# Patient Record
Sex: MALE | Race: WHITE | Employment: FULL TIME | URBAN - METROPOLITAN AREA
[De-identification: names, ages, dates, MRNs, and addresses within clinical notes are randomized per-mention and may not be internally consistent; named-entity substitution may affect disease eponyms.]

---

## 2017-01-17 ENCOUNTER — NURSE ONLY (OUTPATIENT)
Dept: SURGERY | Age: 42
End: 2017-01-17

## 2017-01-17 DIAGNOSIS — E29.1 HYPOGONADISM MALE: Primary | ICD-10-CM

## 2017-01-17 PROCEDURE — 96372 THER/PROPH/DIAG INJ SC/IM: CPT | Performed by: INTERNAL MEDICINE

## 2017-01-17 RX ORDER — TESTOSTERONE CYPIONATE 200 MG/ML
300 INJECTION INTRAMUSCULAR ONCE
Status: COMPLETED | OUTPATIENT
Start: 2017-01-17 | End: 2017-01-17

## 2017-01-17 RX ADMIN — TESTOSTERONE CYPIONATE 300 MG: 200 INJECTION INTRAMUSCULAR at 15:55

## 2017-01-31 ENCOUNTER — OFFICE VISIT (OUTPATIENT)
Dept: FAMILY MEDICINE CLINIC | Age: 42
End: 2017-01-31

## 2017-01-31 VITALS
SYSTOLIC BLOOD PRESSURE: 138 MMHG | TEMPERATURE: 97.9 F | WEIGHT: 238 LBS | HEIGHT: 70 IN | BODY MASS INDEX: 34.07 KG/M2 | HEART RATE: 78 BPM | RESPIRATION RATE: 14 BRPM | DIASTOLIC BLOOD PRESSURE: 86 MMHG

## 2017-01-31 DIAGNOSIS — J01.10 ACUTE NON-RECURRENT FRONTAL SINUSITIS: ICD-10-CM

## 2017-01-31 DIAGNOSIS — J06.9 UPPER RESPIRATORY TRACT INFECTION, UNSPECIFIED TYPE: Primary | ICD-10-CM

## 2017-01-31 PROCEDURE — 99213 OFFICE O/P EST LOW 20 MIN: CPT | Performed by: FAMILY MEDICINE

## 2017-01-31 RX ORDER — SULFAMETHOXAZOLE AND TRIMETHOPRIM 800; 160 MG/1; MG/1
1 TABLET ORAL 2 TIMES DAILY
Qty: 20 TABLET | Refills: 0 | Status: SHIPPED | OUTPATIENT
Start: 2017-01-31 | End: 2017-02-10

## 2017-01-31 RX ORDER — METHYLPREDNISOLONE 4 MG/1
TABLET ORAL
Qty: 1 KIT | Refills: 0 | Status: SHIPPED | OUTPATIENT
Start: 2017-01-31 | End: 2017-05-11 | Stop reason: ALTCHOICE

## 2017-01-31 ASSESSMENT — PATIENT HEALTH QUESTIONNAIRE - PHQ9
2. FEELING DOWN, DEPRESSED OR HOPELESS: 0
SUM OF ALL RESPONSES TO PHQ QUESTIONS 1-9: 0
SUM OF ALL RESPONSES TO PHQ9 QUESTIONS 1 & 2: 0
1. LITTLE INTEREST OR PLEASURE IN DOING THINGS: 0

## 2017-01-31 ASSESSMENT — ENCOUNTER SYMPTOMS
SORE THROAT: 1
NAUSEA: 1
DIARRHEA: 0
SINUS PAIN: 1
SWOLLEN GLANDS: 0
ABDOMINAL PAIN: 0
COUGH: 1
VOMITING: 0
WHEEZING: 0
RHINORRHEA: 1

## 2017-02-21 ENCOUNTER — NURSE ONLY (OUTPATIENT)
Dept: SURGERY | Age: 42
End: 2017-02-21

## 2017-02-21 DIAGNOSIS — E29.1 HYPOGONADISM MALE: Primary | ICD-10-CM

## 2017-02-21 PROCEDURE — 96372 THER/PROPH/DIAG INJ SC/IM: CPT | Performed by: INTERNAL MEDICINE

## 2017-02-21 RX ORDER — TESTOSTERONE CYPIONATE 200 MG/ML
300 INJECTION INTRAMUSCULAR
COMMUNITY

## 2017-02-21 RX ORDER — TESTOSTERONE CYPIONATE 200 MG/ML
300 INJECTION INTRAMUSCULAR ONCE
Status: COMPLETED | OUTPATIENT
Start: 2017-02-21 | End: 2017-02-21

## 2017-02-21 RX ADMIN — TESTOSTERONE CYPIONATE 300 MG: 200 INJECTION INTRAMUSCULAR at 15:55

## 2017-03-22 ENCOUNTER — NURSE ONLY (OUTPATIENT)
Dept: FAMILY MEDICINE CLINIC | Age: 42
End: 2017-03-22

## 2017-03-22 DIAGNOSIS — E29.1 HYPOGONADISM MALE: Primary | ICD-10-CM

## 2017-03-22 PROCEDURE — 96372 THER/PROPH/DIAG INJ SC/IM: CPT | Performed by: FAMILY MEDICINE

## 2017-03-22 RX ORDER — TESTOSTERONE CYPIONATE 200 MG/ML
300 INJECTION INTRAMUSCULAR ONCE
Status: COMPLETED | OUTPATIENT
Start: 2017-03-22 | End: 2017-03-22

## 2017-03-22 RX ADMIN — TESTOSTERONE CYPIONATE 300 MG: 200 INJECTION INTRAMUSCULAR at 08:07

## 2017-05-02 ENCOUNTER — NURSE ONLY (OUTPATIENT)
Dept: SURGERY | Age: 42
End: 2017-05-02

## 2017-05-02 DIAGNOSIS — E29.1 HYPOGONADISM MALE: Primary | ICD-10-CM

## 2017-05-02 PROCEDURE — 96372 THER/PROPH/DIAG INJ SC/IM: CPT | Performed by: INTERNAL MEDICINE

## 2017-05-02 RX ORDER — TESTOSTERONE CYPIONATE 200 MG/ML
300 INJECTION INTRAMUSCULAR ONCE
Status: COMPLETED | OUTPATIENT
Start: 2017-05-02 | End: 2017-05-02

## 2017-05-02 RX ADMIN — TESTOSTERONE CYPIONATE 300 MG: 200 INJECTION INTRAMUSCULAR at 15:30

## 2017-05-09 ENCOUNTER — TELEPHONE (OUTPATIENT)
Dept: FAMILY MEDICINE CLINIC | Age: 42
End: 2017-05-09

## 2017-05-11 ENCOUNTER — OFFICE VISIT (OUTPATIENT)
Dept: FAMILY MEDICINE CLINIC | Age: 42
End: 2017-05-11

## 2017-05-11 VITALS
TEMPERATURE: 97.9 F | SYSTOLIC BLOOD PRESSURE: 118 MMHG | RESPIRATION RATE: 12 BRPM | HEIGHT: 70 IN | WEIGHT: 235 LBS | DIASTOLIC BLOOD PRESSURE: 82 MMHG | HEART RATE: 80 BPM | BODY MASS INDEX: 33.64 KG/M2

## 2017-05-11 DIAGNOSIS — D49.2 SKIN NEOPLASM: Primary | ICD-10-CM

## 2017-05-11 DIAGNOSIS — N20.0 KIDNEY STONE: ICD-10-CM

## 2017-05-11 DIAGNOSIS — Z09 HOSPITAL DISCHARGE FOLLOW-UP: ICD-10-CM

## 2017-05-11 PROCEDURE — 11404 EXC TR-EXT B9+MARG 3.1-4 CM: CPT | Performed by: FAMILY MEDICINE

## 2017-05-11 PROCEDURE — 11420 EXC H-F-NK-SP B9+MARG 0.5/<: CPT | Performed by: FAMILY MEDICINE

## 2017-05-11 ASSESSMENT — ENCOUNTER SYMPTOMS
SINUS PRESSURE: 0
SORE THROAT: 0
EYE DISCHARGE: 0
COUGH: 0
EYE ITCHING: 0
CONSTIPATION: 0
DIARRHEA: 0
SHORTNESS OF BREATH: 0
ABDOMINAL PAIN: 0

## 2017-05-12 ENCOUNTER — OFFICE VISIT (OUTPATIENT)
Dept: FAMILY MEDICINE CLINIC | Age: 42
End: 2017-05-12

## 2017-05-12 VITALS
WEIGHT: 235 LBS | HEIGHT: 70 IN | RESPIRATION RATE: 16 BRPM | SYSTOLIC BLOOD PRESSURE: 118 MMHG | HEART RATE: 76 BPM | BODY MASS INDEX: 33.64 KG/M2 | DIASTOLIC BLOOD PRESSURE: 82 MMHG | TEMPERATURE: 97.8 F

## 2017-05-12 DIAGNOSIS — K80.20 GALLSTONES: ICD-10-CM

## 2017-05-12 DIAGNOSIS — R10.11 RUQ PAIN: ICD-10-CM

## 2017-05-12 DIAGNOSIS — Z09 HOSPITAL DISCHARGE FOLLOW-UP: ICD-10-CM

## 2017-05-12 DIAGNOSIS — N20.0 KIDNEY STONES: ICD-10-CM

## 2017-05-12 DIAGNOSIS — N23 RENAL COLIC ON LEFT SIDE: Primary | ICD-10-CM

## 2017-05-12 PROCEDURE — 99213 OFFICE O/P EST LOW 20 MIN: CPT | Performed by: FAMILY MEDICINE

## 2017-05-12 RX ORDER — HYDROCODONE BITARTRATE AND ACETAMINOPHEN 7.5; 325 MG/1; MG/1
1 TABLET ORAL EVERY 6 HOURS PRN
Qty: 30 TABLET | Refills: 0 | Status: SHIPPED | OUTPATIENT
Start: 2017-05-12 | End: 2017-09-07 | Stop reason: ALTCHOICE

## 2017-05-12 ASSESSMENT — ENCOUNTER SYMPTOMS
SORE THROAT: 0
ABDOMINAL PAIN: 1
EYE ITCHING: 0
SINUS PRESSURE: 0
DIARRHEA: 0
EYE DISCHARGE: 0
CONSTIPATION: 0
COUGH: 0
SHORTNESS OF BREATH: 0

## 2017-05-18 ENCOUNTER — OFFICE VISIT (OUTPATIENT)
Dept: FAMILY MEDICINE CLINIC | Age: 42
End: 2017-05-18

## 2017-05-18 ENCOUNTER — HOSPITAL ENCOUNTER (OUTPATIENT)
Dept: ULTRASOUND IMAGING | Age: 42
Discharge: HOME | End: 2017-05-18
Payer: COMMERCIAL

## 2017-05-18 ENCOUNTER — TELEPHONE (OUTPATIENT)
Dept: FAMILY MEDICINE CLINIC | Age: 42
End: 2017-05-18

## 2017-05-18 VITALS
DIASTOLIC BLOOD PRESSURE: 68 MMHG | HEIGHT: 70 IN | HEART RATE: 68 BPM | BODY MASS INDEX: 33.36 KG/M2 | WEIGHT: 233 LBS | TEMPERATURE: 97.2 F | SYSTOLIC BLOOD PRESSURE: 118 MMHG | RESPIRATION RATE: 16 BRPM

## 2017-05-18 DIAGNOSIS — K80.20 GALLSTONES: ICD-10-CM

## 2017-05-18 DIAGNOSIS — Z48.02 VISIT FOR SUTURE REMOVAL: Primary | ICD-10-CM

## 2017-05-18 DIAGNOSIS — R10.11 RUQ PAIN: ICD-10-CM

## 2017-05-18 DIAGNOSIS — R93.5 ABNORMAL ULTRASOUND OF ABDOMEN: ICD-10-CM

## 2017-05-18 DIAGNOSIS — R16.0 LIVER MASS: Primary | ICD-10-CM

## 2017-05-18 PROCEDURE — 76705 ECHO EXAM OF ABDOMEN: CPT

## 2017-05-18 PROCEDURE — 99024 POSTOP FOLLOW-UP VISIT: CPT | Performed by: FAMILY MEDICINE

## 2017-05-18 ASSESSMENT — ENCOUNTER SYMPTOMS
DIARRHEA: 0
ABDOMINAL PAIN: 0
EYE DISCHARGE: 0
COUGH: 0
CONSTIPATION: 0
SINUS PRESSURE: 0
SORE THROAT: 0
EYE ITCHING: 0
SHORTNESS OF BREATH: 0

## 2017-05-24 ENCOUNTER — OFFICE VISIT (OUTPATIENT)
Dept: FAMILY MEDICINE CLINIC | Age: 42
End: 2017-05-24

## 2017-05-24 VITALS
SYSTOLIC BLOOD PRESSURE: 108 MMHG | TEMPERATURE: 97.6 F | WEIGHT: 236 LBS | HEIGHT: 70 IN | HEART RATE: 81 BPM | BODY MASS INDEX: 33.79 KG/M2 | RESPIRATION RATE: 20 BRPM | DIASTOLIC BLOOD PRESSURE: 72 MMHG

## 2017-05-24 DIAGNOSIS — I20.9 ANGINA, CLASS II (HCC): ICD-10-CM

## 2017-05-24 DIAGNOSIS — R05.8 NON-PRODUCTIVE COUGH: ICD-10-CM

## 2017-05-24 DIAGNOSIS — N20.0 KIDNEY STONES: ICD-10-CM

## 2017-05-24 DIAGNOSIS — J30.2 SEASONAL ALLERGIC RHINITIS, UNSPECIFIED ALLERGIC RHINITIS TRIGGER: ICD-10-CM

## 2017-05-24 DIAGNOSIS — R10.32 GROIN PAIN, LEFT: Primary | ICD-10-CM

## 2017-05-24 LAB
BILIRUBIN, POC: NORMAL
BLOOD URINE, POC: NORMAL
CLARITY, POC: CLEAR
COLOR, POC: NORMAL
GLUCOSE URINE, POC: NORMAL
KETONES, POC: NORMAL
LEUKOCYTE EST, POC: NORMAL
NITRITE, POC: NORMAL
PH, POC: 7
PROTEIN, POC: NORMAL
SPECIFIC GRAVITY, POC: 1.01
UROBILINOGEN, POC: 0.2

## 2017-05-24 PROCEDURE — 81003 URINALYSIS AUTO W/O SCOPE: CPT | Performed by: FAMILY MEDICINE

## 2017-05-24 PROCEDURE — 99213 OFFICE O/P EST LOW 20 MIN: CPT | Performed by: FAMILY MEDICINE

## 2017-05-24 PROCEDURE — 96372 THER/PROPH/DIAG INJ SC/IM: CPT | Performed by: FAMILY MEDICINE

## 2017-05-24 RX ORDER — TRIAMCINOLONE ACETONIDE 40 MG/ML
80 INJECTION, SUSPENSION INTRA-ARTICULAR; INTRAMUSCULAR ONCE
Status: COMPLETED | OUTPATIENT
Start: 2017-05-24 | End: 2017-05-24

## 2017-05-24 RX ORDER — TAMSULOSIN HYDROCHLORIDE 0.4 MG/1
0.4 CAPSULE ORAL DAILY
Qty: 30 CAPSULE | Refills: 2 | Status: SHIPPED | OUTPATIENT
Start: 2017-05-24 | End: 2017-10-03 | Stop reason: SDUPTHER

## 2017-05-24 RX ORDER — HYDROCODONE BITARTRATE AND ACETAMINOPHEN 7.5; 325 MG/1; MG/1
1 TABLET ORAL EVERY 6 HOURS PRN
Qty: 30 TABLET | Refills: 0 | Status: SHIPPED | OUTPATIENT
Start: 2017-05-24 | End: 2017-08-25

## 2017-05-24 RX ADMIN — TRIAMCINOLONE ACETONIDE 80 MG: 40 INJECTION, SUSPENSION INTRA-ARTICULAR; INTRAMUSCULAR at 11:59

## 2017-05-24 ASSESSMENT — ENCOUNTER SYMPTOMS
NAUSEA: 1
CONSTIPATION: 0
EYE DISCHARGE: 1
EYE ITCHING: 1
DIARRHEA: 0
COUGH: 1
SORE THROAT: 1

## 2017-05-30 ENCOUNTER — HOSPITAL ENCOUNTER (OUTPATIENT)
Dept: CT IMAGING | Age: 42
Discharge: HOME | End: 2017-05-30
Payer: COMMERCIAL

## 2017-05-30 VITALS — HEIGHT: 70 IN | WEIGHT: 230 LBS | BODY MASS INDEX: 32.93 KG/M2

## 2017-05-30 DIAGNOSIS — R16.0 LIVER MASS: ICD-10-CM

## 2017-05-30 DIAGNOSIS — R93.5 ABNORMAL ULTRASOUND OF ABDOMEN: ICD-10-CM

## 2017-05-30 PROCEDURE — 6360000004 HC RX CONTRAST MEDICATION: Performed by: RADIOLOGY

## 2017-05-30 PROCEDURE — 2500000003 HC RX 250 WO HCPCS: Performed by: RADIOLOGY

## 2017-05-30 PROCEDURE — 74178 CT ABD&PLV WO CNTR FLWD CNTR: CPT

## 2017-05-30 RX ADMIN — IOPAMIDOL 100 ML: 755 INJECTION, SOLUTION INTRAVENOUS at 16:40

## 2017-05-30 RX ADMIN — BARIUM SULFATE 450 ML: 21 SUSPENSION ORAL at 16:40

## 2017-06-01 ENCOUNTER — TELEPHONE (OUTPATIENT)
Dept: FAMILY MEDICINE CLINIC | Age: 42
End: 2017-06-01

## 2017-06-01 DIAGNOSIS — R93.2 ABNORMAL LIVER CT: Primary | ICD-10-CM

## 2017-06-02 ENCOUNTER — NURSE ONLY (OUTPATIENT)
Dept: FAMILY MEDICINE CLINIC | Age: 42
End: 2017-06-02

## 2017-06-02 DIAGNOSIS — E29.1 HYPOGONADISM MALE: Primary | ICD-10-CM

## 2017-06-02 PROCEDURE — 96372 THER/PROPH/DIAG INJ SC/IM: CPT | Performed by: FAMILY MEDICINE

## 2017-06-02 RX ORDER — TESTOSTERONE CYPIONATE 200 MG/ML
200 INJECTION INTRAMUSCULAR ONCE
Status: SHIPPED | OUTPATIENT
Start: 2017-06-02

## 2017-06-02 RX ORDER — TESTOSTERONE CYPIONATE 200 MG/ML
300 INJECTION INTRAMUSCULAR ONCE
Status: COMPLETED | OUTPATIENT
Start: 2017-06-02 | End: 2017-06-02

## 2017-06-02 RX ADMIN — TESTOSTERONE CYPIONATE 300 MG: 200 INJECTION INTRAMUSCULAR at 15:03

## 2017-06-14 ENCOUNTER — HOSPITAL ENCOUNTER (OUTPATIENT)
Dept: MRI IMAGING | Age: 42
Discharge: HOME | End: 2017-06-14
Payer: COMMERCIAL

## 2017-06-14 VITALS — WEIGHT: 230 LBS | BODY MASS INDEX: 33 KG/M2

## 2017-06-14 DIAGNOSIS — R93.2 ABNORMAL LIVER CT: ICD-10-CM

## 2017-06-14 PROCEDURE — 74183 MRI ABD W/O CNTR FLWD CNTR: CPT

## 2017-06-14 PROCEDURE — A9579 GAD-BASE MR CONTRAST NOS,1ML: HCPCS | Performed by: FAMILY MEDICINE

## 2017-06-14 PROCEDURE — 6360000004 HC RX CONTRAST MEDICATION: Performed by: FAMILY MEDICINE

## 2017-06-14 RX ADMIN — GADOVERSETAMIDE 20 ML: 0.5 INJECTION, SOLUTION INTRAVENOUS at 08:40

## 2017-07-07 ENCOUNTER — NURSE ONLY (OUTPATIENT)
Dept: FAMILY MEDICINE CLINIC | Age: 42
End: 2017-07-07

## 2017-07-07 DIAGNOSIS — E29.1 HYPOGONADISM MALE: Primary | ICD-10-CM

## 2017-07-07 PROCEDURE — 96372 THER/PROPH/DIAG INJ SC/IM: CPT | Performed by: FAMILY MEDICINE

## 2017-07-07 RX ORDER — TESTOSTERONE CYPIONATE 200 MG/ML
300 INJECTION INTRAMUSCULAR ONCE
Status: COMPLETED | OUTPATIENT
Start: 2017-07-07 | End: 2017-07-07

## 2017-07-07 RX ADMIN — TESTOSTERONE CYPIONATE 300 MG: 200 INJECTION INTRAMUSCULAR at 15:34

## 2017-08-04 ENCOUNTER — NURSE ONLY (OUTPATIENT)
Dept: FAMILY MEDICINE CLINIC | Age: 42
End: 2017-08-04

## 2017-08-04 DIAGNOSIS — E29.1 HYPOGONADISM MALE: Primary | ICD-10-CM

## 2017-08-04 PROCEDURE — 96372 THER/PROPH/DIAG INJ SC/IM: CPT | Performed by: FAMILY MEDICINE

## 2017-08-04 RX ORDER — TESTOSTERONE CYPIONATE 200 MG/ML
300 INJECTION INTRAMUSCULAR ONCE
Status: COMPLETED | OUTPATIENT
Start: 2017-08-04 | End: 2017-08-04

## 2017-08-04 RX ADMIN — TESTOSTERONE CYPIONATE 300 MG: 200 INJECTION INTRAMUSCULAR at 15:51

## 2017-08-25 ENCOUNTER — OFFICE VISIT (OUTPATIENT)
Dept: PRIMARY CARE CLINIC | Age: 42
End: 2017-08-25

## 2017-08-25 VITALS
WEIGHT: 232 LBS | HEART RATE: 68 BPM | SYSTOLIC BLOOD PRESSURE: 130 MMHG | TEMPERATURE: 97.9 F | RESPIRATION RATE: 14 BRPM | HEIGHT: 70 IN | DIASTOLIC BLOOD PRESSURE: 74 MMHG | BODY MASS INDEX: 33.21 KG/M2

## 2017-08-25 DIAGNOSIS — N39.0 URINARY TRACT INFECTION WITHOUT HEMATURIA, SITE UNSPECIFIED: ICD-10-CM

## 2017-08-25 DIAGNOSIS — K58.8 OTHER IRRITABLE BOWEL SYNDROME: ICD-10-CM

## 2017-08-25 DIAGNOSIS — N20.0 KIDNEY STONE: ICD-10-CM

## 2017-08-25 DIAGNOSIS — R10.9 ABDOMINAL PAIN, UNSPECIFIED LOCATION: ICD-10-CM

## 2017-08-25 DIAGNOSIS — N20.0 KIDNEY STONE: Primary | ICD-10-CM

## 2017-08-25 LAB
BILIRUBIN, POC: NORMAL
BLOOD URINE, POC: NORMAL
CLARITY, POC: CLEAR
COLOR, POC: YELLOW
GLUCOSE URINE, POC: NORMAL
KETONES, POC: NORMAL
LEUKOCYTE EST, POC: NORMAL
NITRITE, POC: NORMAL
PH, POC: 7
PROTEIN, POC: NORMAL
SPECIFIC GRAVITY, POC: 1.02
UROBILINOGEN, POC: NORMAL

## 2017-08-25 PROCEDURE — 96372 THER/PROPH/DIAG INJ SC/IM: CPT | Performed by: FAMILY MEDICINE

## 2017-08-25 PROCEDURE — 81002 URINALYSIS NONAUTO W/O SCOPE: CPT | Performed by: FAMILY MEDICINE

## 2017-08-25 PROCEDURE — 99213 OFFICE O/P EST LOW 20 MIN: CPT | Performed by: FAMILY MEDICINE

## 2017-08-25 RX ORDER — CIPROFLOXACIN 500 MG/1
TABLET, FILM COATED ORAL
Qty: 14 TABLET | Refills: 0 | Status: SHIPPED | OUTPATIENT
Start: 2017-08-25 | End: 2017-09-04

## 2017-08-25 RX ORDER — KETOROLAC TROMETHAMINE 10 MG/1
10 TABLET, FILM COATED ORAL EVERY 6 HOURS PRN
Qty: 20 TABLET | Refills: 1 | Status: SHIPPED | OUTPATIENT
Start: 2017-08-25 | End: 2017-09-07 | Stop reason: ALTCHOICE

## 2017-08-25 RX ORDER — KETOROLAC TROMETHAMINE 30 MG/ML
60 INJECTION, SOLUTION INTRAMUSCULAR; INTRAVENOUS ONCE
Status: COMPLETED | OUTPATIENT
Start: 2017-08-25 | End: 2017-08-25

## 2017-08-25 RX ADMIN — KETOROLAC TROMETHAMINE 60 MG: 30 INJECTION, SOLUTION INTRAMUSCULAR; INTRAVENOUS at 12:58

## 2017-08-25 ASSESSMENT — ENCOUNTER SYMPTOMS
CHEST TIGHTNESS: 0
ABDOMINAL PAIN: 0
SHORTNESS OF BREATH: 0

## 2017-08-27 LAB — URINE CULTURE, ROUTINE: NORMAL

## 2017-09-05 ASSESSMENT — ENCOUNTER SYMPTOMS
BELCHING: 0
CONSTIPATION: 0
FLATUS: 0

## 2017-09-07 ENCOUNTER — OFFICE VISIT (OUTPATIENT)
Dept: FAMILY MEDICINE CLINIC | Age: 42
End: 2017-09-07

## 2017-09-07 VITALS
SYSTOLIC BLOOD PRESSURE: 138 MMHG | BODY MASS INDEX: 32.21 KG/M2 | TEMPERATURE: 97.2 F | HEART RATE: 70 BPM | RESPIRATION RATE: 20 BRPM | DIASTOLIC BLOOD PRESSURE: 82 MMHG | WEIGHT: 225 LBS | HEIGHT: 70 IN

## 2017-09-07 DIAGNOSIS — R05.8 NON-PRODUCTIVE COUGH: Primary | ICD-10-CM

## 2017-09-07 DIAGNOSIS — J01.90 ACUTE BACTERIAL SINUSITIS: ICD-10-CM

## 2017-09-07 DIAGNOSIS — B96.89 ACUTE BACTERIAL SINUSITIS: ICD-10-CM

## 2017-09-07 PROCEDURE — 99213 OFFICE O/P EST LOW 20 MIN: CPT | Performed by: FAMILY MEDICINE

## 2017-09-07 RX ORDER — NAPROXEN 500 MG/1
500 TABLET ORAL 2 TIMES DAILY
Refills: 0 | COMMUNITY
Start: 2017-08-22

## 2017-09-07 RX ORDER — AZITHROMYCIN 250 MG/1
TABLET, FILM COATED ORAL
Qty: 1 PACKET | Refills: 0 | Status: SHIPPED | OUTPATIENT
Start: 2017-09-07 | End: 2017-09-17

## 2017-09-07 ASSESSMENT — ENCOUNTER SYMPTOMS
EYE ITCHING: 1
CONSTIPATION: 0
ABDOMINAL PAIN: 0
EYE DISCHARGE: 0
DIARRHEA: 0
COUGH: 1
SINUS PRESSURE: 1
SORE THROAT: 1
EYE PAIN: 1
SHORTNESS OF BREATH: 0

## 2017-09-12 ENCOUNTER — NURSE ONLY (OUTPATIENT)
Dept: SURGERY | Age: 42
End: 2017-09-12

## 2017-09-12 ENCOUNTER — TELEPHONE (OUTPATIENT)
Dept: FAMILY MEDICINE CLINIC | Age: 42
End: 2017-09-12

## 2017-09-12 DIAGNOSIS — E29.1 HYPOGONADISM MALE: Primary | ICD-10-CM

## 2017-09-12 PROCEDURE — 96372 THER/PROPH/DIAG INJ SC/IM: CPT | Performed by: INTERNAL MEDICINE

## 2017-09-12 RX ORDER — TESTOSTERONE CYPIONATE 200 MG/ML
300 INJECTION INTRAMUSCULAR ONCE
Status: COMPLETED | OUTPATIENT
Start: 2017-09-12 | End: 2017-09-12

## 2017-10-03 RX ORDER — TAMSULOSIN HYDROCHLORIDE 0.4 MG/1
CAPSULE ORAL
Qty: 30 CAPSULE | Refills: 2 | Status: SHIPPED | OUTPATIENT
Start: 2017-10-03 | End: 2018-10-09 | Stop reason: SDUPTHER

## 2017-10-10 ENCOUNTER — NURSE ONLY (OUTPATIENT)
Dept: SURGERY | Age: 42
End: 2017-10-10

## 2017-10-10 DIAGNOSIS — E29.1 HYPOGONADISM MALE: Primary | ICD-10-CM

## 2017-10-10 PROCEDURE — 96372 THER/PROPH/DIAG INJ SC/IM: CPT | Performed by: INTERNAL MEDICINE

## 2017-10-10 PROCEDURE — 99999 PR OFFICE/OUTPT VISIT,PROCEDURE ONLY: CPT | Performed by: INTERNAL MEDICINE

## 2017-10-10 RX ORDER — TESTOSTERONE CYPIONATE 200 MG/ML
400 INJECTION INTRAMUSCULAR ONCE
Status: COMPLETED | OUTPATIENT
Start: 2017-10-10 | End: 2017-10-10

## 2017-10-10 RX ADMIN — TESTOSTERONE CYPIONATE 400 MG: 200 INJECTION INTRAMUSCULAR at 15:31

## 2017-11-07 ENCOUNTER — NURSE ONLY (OUTPATIENT)
Dept: SURGERY | Age: 42
End: 2017-11-07

## 2017-11-07 DIAGNOSIS — E29.1 HYPOGONADISM MALE: Primary | ICD-10-CM

## 2017-11-07 PROCEDURE — 96372 THER/PROPH/DIAG INJ SC/IM: CPT | Performed by: INTERNAL MEDICINE

## 2017-11-07 RX ORDER — TESTOSTERONE CYPIONATE 200 MG/ML
400 INJECTION INTRAMUSCULAR ONCE
Status: COMPLETED | OUTPATIENT
Start: 2017-11-07 | End: 2017-11-07

## 2017-11-07 RX ADMIN — TESTOSTERONE CYPIONATE 400 MG: 200 INJECTION INTRAMUSCULAR at 15:36

## 2017-11-21 DIAGNOSIS — E29.1 HYPOGONADISM MALE: ICD-10-CM

## 2017-11-23 LAB — TESTOSTERONE TOTAL-MALE: 892 NG/DL (ref 300–890)

## 2017-12-05 ENCOUNTER — NURSE ONLY (OUTPATIENT)
Dept: SURGERY | Age: 42
End: 2017-12-05

## 2017-12-05 DIAGNOSIS — E29.1 HYPOGONADISM MALE: Primary | ICD-10-CM

## 2017-12-05 PROCEDURE — 96372 THER/PROPH/DIAG INJ SC/IM: CPT | Performed by: INTERNAL MEDICINE

## 2017-12-05 RX ORDER — TESTOSTERONE CYPIONATE 200 MG/ML
400 INJECTION INTRAMUSCULAR ONCE
Status: COMPLETED | OUTPATIENT
Start: 2017-12-05 | End: 2017-12-05

## 2017-12-05 RX ADMIN — TESTOSTERONE CYPIONATE 400 MG: 200 INJECTION INTRAMUSCULAR at 15:32

## 2017-12-05 NOTE — PROGRESS NOTES
Pt here for Testosterone Injection   Doing fine   Testosterone Cypionate 200mg/ml   Lot# [id number]  Exp 4/19   2cc Im - Left Gluteal

## 2018-01-09 ENCOUNTER — NURSE ONLY (OUTPATIENT)
Dept: SURGERY | Age: 43
End: 2018-01-09

## 2018-01-09 DIAGNOSIS — E29.1 HYPOGONADISM MALE: Primary | ICD-10-CM

## 2018-01-09 PROCEDURE — 96372 THER/PROPH/DIAG INJ SC/IM: CPT | Performed by: INTERNAL MEDICINE

## 2018-01-09 RX ORDER — TESTOSTERONE CYPIONATE 200 MG/ML
400 INJECTION INTRAMUSCULAR ONCE
Status: COMPLETED | OUTPATIENT
Start: 2018-01-09 | End: 2018-01-09

## 2018-01-09 RX ADMIN — TESTOSTERONE CYPIONATE 400 MG: 200 INJECTION INTRAMUSCULAR at 15:42

## 2018-01-09 NOTE — PROGRESS NOTES
Pt here for Testosterone Injection   Doing fine   Testosterone Cypionate 200mg/ml   Lot# [id number] Exp 4/19   2cc Im - Right Gluteal

## 2018-02-06 ENCOUNTER — NURSE ONLY (OUTPATIENT)
Dept: ENDOCRINOLOGY | Age: 43
End: 2018-02-06
Payer: COMMERCIAL

## 2018-02-06 DIAGNOSIS — E29.1 HYPOGONADISM MALE: Primary | ICD-10-CM

## 2018-02-06 PROCEDURE — 96372 THER/PROPH/DIAG INJ SC/IM: CPT | Performed by: INTERNAL MEDICINE

## 2018-02-06 RX ORDER — TESTOSTERONE CYPIONATE 200 MG/ML
400 INJECTION INTRAMUSCULAR ONCE
Status: COMPLETED | OUTPATIENT
Start: 2018-02-06 | End: 2018-02-06

## 2018-02-06 RX ADMIN — TESTOSTERONE CYPIONATE 400 MG: 200 INJECTION INTRAMUSCULAR at 15:35

## 2018-03-09 ENCOUNTER — OFFICE VISIT (OUTPATIENT)
Dept: FAMILY MEDICINE CLINIC | Age: 43
End: 2018-03-09
Payer: COMMERCIAL

## 2018-03-09 ENCOUNTER — NURSE ONLY (OUTPATIENT)
Dept: FAMILY MEDICINE CLINIC | Age: 43
End: 2018-03-09
Payer: COMMERCIAL

## 2018-03-09 VITALS
BODY MASS INDEX: 33.21 KG/M2 | RESPIRATION RATE: 20 BRPM | TEMPERATURE: 97.6 F | DIASTOLIC BLOOD PRESSURE: 86 MMHG | WEIGHT: 232 LBS | HEIGHT: 70 IN | HEART RATE: 79 BPM | SYSTOLIC BLOOD PRESSURE: 124 MMHG

## 2018-03-09 DIAGNOSIS — N40.0 BENIGN PROSTATIC HYPERPLASIA WITHOUT LOWER URINARY TRACT SYMPTOMS: ICD-10-CM

## 2018-03-09 DIAGNOSIS — Z87.442 HISTORY OF KIDNEY STONES: ICD-10-CM

## 2018-03-09 DIAGNOSIS — Z13.6 SCREENING FOR CARDIOVASCULAR CONDITION: ICD-10-CM

## 2018-03-09 DIAGNOSIS — R05.9 COUGH: ICD-10-CM

## 2018-03-09 DIAGNOSIS — R53.83 MALAISE AND FATIGUE: ICD-10-CM

## 2018-03-09 DIAGNOSIS — R53.81 MALAISE AND FATIGUE: ICD-10-CM

## 2018-03-09 DIAGNOSIS — R73.9 HYPERGLYCEMIA: ICD-10-CM

## 2018-03-09 DIAGNOSIS — R10.12 LEFT UPPER QUADRANT PAIN: Primary | ICD-10-CM

## 2018-03-09 DIAGNOSIS — E29.1 HYPOGONADISM MALE: Primary | ICD-10-CM

## 2018-03-09 LAB
ALBUMIN SERPL-MCNC: 4.4 G/DL (ref 3.9–4.9)
ALP BLD-CCNC: 73 U/L (ref 35–104)
ALT SERPL-CCNC: 23 U/L (ref 0–41)
ANION GAP SERPL CALCULATED.3IONS-SCNC: 15 MEQ/L (ref 7–13)
AST SERPL-CCNC: 22 U/L (ref 0–40)
BASOPHILS ABSOLUTE: 0.1 K/UL (ref 0–0.2)
BASOPHILS RELATIVE PERCENT: 0.6 %
BILIRUB SERPL-MCNC: 0.4 MG/DL (ref 0–1.2)
BILIRUBIN, POC: NORMAL
BLOOD URINE, POC: NORMAL
BUN BLDV-MCNC: 13 MG/DL (ref 6–20)
CALCIUM SERPL-MCNC: 9.1 MG/DL (ref 8.6–10.2)
CHLORIDE BLD-SCNC: 103 MEQ/L (ref 98–107)
CHOLESTEROL, TOTAL: 207 MG/DL (ref 0–199)
CLARITY, POC: CLEAR
CO2: 25 MEQ/L (ref 22–29)
COLOR, POC: NORMAL
CREAT SERPL-MCNC: 1.06 MG/DL (ref 0.7–1.2)
EOSINOPHILS ABSOLUTE: 0.4 K/UL (ref 0–0.7)
EOSINOPHILS RELATIVE PERCENT: 3.9 %
GFR AFRICAN AMERICAN: >60
GFR NON-AFRICAN AMERICAN: >60
GLOBULIN: 2.5 G/DL (ref 2.3–3.5)
GLUCOSE BLD-MCNC: 77 MG/DL (ref 74–109)
GLUCOSE URINE, POC: NORMAL
HBA1C MFR BLD: 5.4 % (ref 4.8–5.9)
HCT VFR BLD CALC: 47.9 % (ref 42–52)
HDLC SERPL-MCNC: 53 MG/DL (ref 40–59)
HEMOGLOBIN: 16.1 G/DL (ref 14–18)
KETONES, POC: NORMAL
LDL CHOLESTEROL CALCULATED: 122 MG/DL (ref 0–129)
LEUKOCYTE EST, POC: NORMAL
LYMPHOCYTES ABSOLUTE: 3.1 K/UL (ref 1–4.8)
LYMPHOCYTES RELATIVE PERCENT: 30.6 %
MCH RBC QN AUTO: 28.3 PG (ref 27–31.3)
MCHC RBC AUTO-ENTMCNC: 33.6 % (ref 33–37)
MCV RBC AUTO: 84.1 FL (ref 80–100)
MONOCYTES ABSOLUTE: 0.8 K/UL (ref 0.2–0.8)
MONOCYTES RELATIVE PERCENT: 8.3 %
NEUTROPHILS ABSOLUTE: 5.8 K/UL (ref 1.4–6.5)
NEUTROPHILS RELATIVE PERCENT: 56.6 %
NITRITE, POC: NORMAL
PDW BLD-RTO: 13.5 % (ref 11.5–14.5)
PH, POC: 6
PLATELET # BLD: 217 K/UL (ref 130–400)
POTASSIUM SERPL-SCNC: 4.2 MEQ/L (ref 3.5–5.1)
PROSTATE SPECIFIC ANTIGEN: 1.03 NG/ML
PROTEIN, POC: NORMAL
RBC # BLD: 5.7 M/UL (ref 4.7–6.1)
SODIUM BLD-SCNC: 143 MEQ/L (ref 132–144)
SPECIFIC GRAVITY, POC: 1.01
TOTAL PROTEIN: 6.9 G/DL (ref 6.4–8.1)
TRIGL SERPL-MCNC: 161 MG/DL (ref 0–200)
UROBILINOGEN, POC: 0.2
WBC # BLD: 10.2 K/UL (ref 4.8–10.8)

## 2018-03-09 PROCEDURE — G8417 CALC BMI ABV UP PARAM F/U: HCPCS | Performed by: FAMILY MEDICINE

## 2018-03-09 PROCEDURE — 1036F TOBACCO NON-USER: CPT | Performed by: FAMILY MEDICINE

## 2018-03-09 PROCEDURE — G8599 NO ASA/ANTIPLAT THER USE RNG: HCPCS | Performed by: FAMILY MEDICINE

## 2018-03-09 PROCEDURE — G8427 DOCREV CUR MEDS BY ELIG CLIN: HCPCS | Performed by: FAMILY MEDICINE

## 2018-03-09 PROCEDURE — 81002 URINALYSIS NONAUTO W/O SCOPE: CPT | Performed by: FAMILY MEDICINE

## 2018-03-09 PROCEDURE — 99213 OFFICE O/P EST LOW 20 MIN: CPT | Performed by: FAMILY MEDICINE

## 2018-03-09 PROCEDURE — G8484 FLU IMMUNIZE NO ADMIN: HCPCS | Performed by: FAMILY MEDICINE

## 2018-03-09 PROCEDURE — 96372 THER/PROPH/DIAG INJ SC/IM: CPT | Performed by: FAMILY MEDICINE

## 2018-03-09 RX ORDER — TESTOSTERONE CYPIONATE 200 MG/ML
400 INJECTION INTRAMUSCULAR ONCE
Status: COMPLETED | OUTPATIENT
Start: 2018-03-09 | End: 2018-03-09

## 2018-03-09 RX ORDER — CEFDINIR 300 MG/1
300 CAPSULE ORAL 2 TIMES DAILY
Qty: 20 CAPSULE | Refills: 0 | Status: SHIPPED | OUTPATIENT
Start: 2018-03-09

## 2018-03-09 RX ADMIN — TESTOSTERONE CYPIONATE 400 MG: 200 INJECTION INTRAMUSCULAR at 15:35

## 2018-03-09 ASSESSMENT — ENCOUNTER SYMPTOMS
COUGH: 1
DIARRHEA: 0
SORE THROAT: 1
SHORTNESS OF BREATH: 0
ABDOMINAL PAIN: 1
EYE DISCHARGE: 0
CONSTIPATION: 0
EYE ITCHING: 0
SINUS PRESSURE: 1

## 2018-03-09 NOTE — PROGRESS NOTES
Expiration Date:   3/9/2019    Comprehensive Metabolic Panel     Standing Status:   Future     Number of Occurrences:   1     Standing Expiration Date:   3/9/2019    Lipid Panel     Standing Status:   Future     Number of Occurrences:   1     Standing Expiration Date:   3/9/2019     Order Specific Question:   Is Patient Fasting?/# of Hours     Answer:   na    Hemoglobin A1C     Standing Status:   Future     Number of Occurrences:   1     Standing Expiration Date:   3/9/2019    POCT Urinalysis no Micro     Orders Placed This Encounter   Medications    cefdinir (OMNICEF) 300 MG capsule     Sig: Take 1 capsule by mouth 2 times daily     Dispense:  20 capsule     Refill:  0     There are no discontinued medications. No Follow-up on file.         Controlled Substances Monitoring:                                Chris Drew MD

## 2018-03-14 ENCOUNTER — TELEPHONE (OUTPATIENT)
Dept: FAMILY MEDICINE CLINIC | Age: 43
End: 2018-03-14

## 2018-03-14 DIAGNOSIS — Z87.442 HISTORY OF KIDNEY STONES: Primary | ICD-10-CM

## 2018-03-14 NOTE — TELEPHONE ENCOUNTER
Patient has a history of kidney stones and now has left flank pain and hematuria. He needs a CAT scan to check for kidney stones.

## 2018-03-15 DIAGNOSIS — Z87.442 HISTORY OF KIDNEY STONES: Primary | ICD-10-CM

## 2018-04-10 ENCOUNTER — NURSE ONLY (OUTPATIENT)
Dept: ENDOCRINOLOGY | Age: 43
End: 2018-04-10
Payer: COMMERCIAL

## 2018-04-10 DIAGNOSIS — E29.1 HYPOGONADISM MALE: Primary | ICD-10-CM

## 2018-04-10 PROCEDURE — 96372 THER/PROPH/DIAG INJ SC/IM: CPT | Performed by: INTERNAL MEDICINE

## 2018-04-10 RX ORDER — TESTOSTERONE CYPIONATE 200 MG/ML
400 INJECTION INTRAMUSCULAR ONCE
Status: COMPLETED | OUTPATIENT
Start: 2018-04-10 | End: 2018-04-10

## 2018-04-10 RX ADMIN — TESTOSTERONE CYPIONATE 400 MG: 200 INJECTION INTRAMUSCULAR at 15:38

## 2018-05-08 ENCOUNTER — NURSE ONLY (OUTPATIENT)
Dept: ENDOCRINOLOGY | Age: 43
End: 2018-05-08
Payer: COMMERCIAL

## 2018-05-08 DIAGNOSIS — E29.1 HYPOGONADISM MALE: Primary | ICD-10-CM

## 2018-05-08 PROCEDURE — 96372 THER/PROPH/DIAG INJ SC/IM: CPT | Performed by: INTERNAL MEDICINE

## 2018-05-08 RX ORDER — TESTOSTERONE CYPIONATE 200 MG/ML
400 INJECTION INTRAMUSCULAR ONCE
Status: COMPLETED | OUTPATIENT
Start: 2018-05-08 | End: 2018-05-08

## 2018-05-08 RX ADMIN — TESTOSTERONE CYPIONATE 400 MG: 200 INJECTION INTRAMUSCULAR at 16:06

## 2018-06-08 ENCOUNTER — NURSE ONLY (OUTPATIENT)
Dept: FAMILY MEDICINE CLINIC | Age: 43
End: 2018-06-08
Payer: COMMERCIAL

## 2018-06-08 DIAGNOSIS — R79.89 LOW TESTOSTERONE IN MALE: Primary | ICD-10-CM

## 2018-06-08 PROCEDURE — 96372 THER/PROPH/DIAG INJ SC/IM: CPT | Performed by: FAMILY MEDICINE

## 2018-06-08 RX ORDER — TESTOSTERONE CYPIONATE 200 MG/ML
400 INJECTION INTRAMUSCULAR ONCE
Status: COMPLETED | OUTPATIENT
Start: 2018-06-08 | End: 2018-06-08

## 2018-06-08 RX ADMIN — TESTOSTERONE CYPIONATE 400 MG: 200 INJECTION INTRAMUSCULAR at 13:03

## 2018-07-27 ENCOUNTER — NURSE ONLY (OUTPATIENT)
Dept: FAMILY MEDICINE CLINIC | Age: 43
End: 2018-07-27
Payer: COMMERCIAL

## 2018-07-27 DIAGNOSIS — E29.1 HYPOGONADISM IN MALE: Primary | ICD-10-CM

## 2018-07-27 PROCEDURE — 96372 THER/PROPH/DIAG INJ SC/IM: CPT | Performed by: FAMILY MEDICINE

## 2018-07-27 RX ORDER — TESTOSTERONE CYPIONATE 200 MG/ML
400 INJECTION INTRAMUSCULAR ONCE
Status: COMPLETED | OUTPATIENT
Start: 2018-07-27 | End: 2018-07-27

## 2018-07-27 RX ADMIN — TESTOSTERONE CYPIONATE 400 MG: 200 INJECTION INTRAMUSCULAR at 15:16

## 2018-08-28 ENCOUNTER — NURSE ONLY (OUTPATIENT)
Dept: ENDOCRINOLOGY | Age: 43
End: 2018-08-28
Payer: COMMERCIAL

## 2018-08-28 DIAGNOSIS — R79.89 LOW TESTOSTERONE IN MALE: Primary | ICD-10-CM

## 2018-08-28 PROCEDURE — 96372 THER/PROPH/DIAG INJ SC/IM: CPT | Performed by: INTERNAL MEDICINE

## 2018-08-28 RX ORDER — TESTOSTERONE CYPIONATE 200 MG/ML
400 INJECTION INTRAMUSCULAR ONCE
Status: COMPLETED | OUTPATIENT
Start: 2018-08-28 | End: 2018-08-28

## 2018-08-28 RX ADMIN — TESTOSTERONE CYPIONATE 400 MG: 200 INJECTION INTRAMUSCULAR at 16:00

## 2018-08-28 NOTE — PROGRESS NOTES
Pt here for Testosterone Injection   Doing fine   Testosterone Cypionate 200mg/ml   Lot# [id number] Exp 11/19  2cc Im  Right Gluteal

## 2018-09-26 ENCOUNTER — NURSE ONLY (OUTPATIENT)
Dept: FAMILY MEDICINE CLINIC | Age: 43
End: 2018-09-26
Payer: COMMERCIAL

## 2018-09-26 DIAGNOSIS — R79.89 LOW TESTOSTERONE IN MALE: Primary | ICD-10-CM

## 2018-09-26 PROCEDURE — 96372 THER/PROPH/DIAG INJ SC/IM: CPT | Performed by: FAMILY MEDICINE

## 2018-09-26 RX ORDER — TESTOSTERONE CYPIONATE 200 MG/ML
400 INJECTION INTRAMUSCULAR ONCE
Status: COMPLETED | OUTPATIENT
Start: 2018-09-26 | End: 2018-09-26

## 2018-09-26 RX ADMIN — TESTOSTERONE CYPIONATE 400 MG: 200 INJECTION INTRAMUSCULAR at 08:40

## 2018-09-26 NOTE — PROGRESS NOTES
Pt here for Testosterone Injection   Doing fine   Testosterone Cypionate 200mg/ml   Lot# [id number]? Exp 02/20  2cc IM - Left Gluteal

## 2018-10-09 ENCOUNTER — OFFICE VISIT (OUTPATIENT)
Dept: FAMILY MEDICINE CLINIC | Age: 43
End: 2018-10-09
Payer: COMMERCIAL

## 2018-10-09 VITALS
HEIGHT: 70 IN | RESPIRATION RATE: 16 BRPM | WEIGHT: 217.4 LBS | BODY MASS INDEX: 31.12 KG/M2 | OXYGEN SATURATION: 99 % | DIASTOLIC BLOOD PRESSURE: 72 MMHG | SYSTOLIC BLOOD PRESSURE: 110 MMHG | HEART RATE: 81 BPM | TEMPERATURE: 97.1 F

## 2018-10-09 DIAGNOSIS — J34.89 SINUS PRESSURE: ICD-10-CM

## 2018-10-09 DIAGNOSIS — K52.9 ACUTE GASTROENTERITIS: Primary | ICD-10-CM

## 2018-10-09 DIAGNOSIS — R53.83 FATIGUE, UNSPECIFIED TYPE: ICD-10-CM

## 2018-10-09 PROCEDURE — 99213 OFFICE O/P EST LOW 20 MIN: CPT | Performed by: NURSE PRACTITIONER

## 2018-10-09 RX ORDER — TAMSULOSIN HYDROCHLORIDE 0.4 MG/1
CAPSULE ORAL
Qty: 30 CAPSULE | Refills: 0 | Status: SHIPPED | OUTPATIENT
Start: 2018-10-09 | End: 2018-12-06 | Stop reason: SDUPTHER

## 2018-10-09 ASSESSMENT — ENCOUNTER SYMPTOMS
CONSTIPATION: 0
COUGH: 1
DIARRHEA: 0
VOMITING: 0
CHEST TIGHTNESS: 0
EYE DISCHARGE: 0
EYE REDNESS: 0
EYE PAIN: 0
NAUSEA: 0
SINUS PRESSURE: 1
TROUBLE SWALLOWING: 0
RHINORRHEA: 0
SORE THROAT: 1
SHORTNESS OF BREATH: 0
WHEEZING: 0
EYE ITCHING: 0
SINUS PAIN: 1

## 2018-10-09 ASSESSMENT — PATIENT HEALTH QUESTIONNAIRE - PHQ9
SUM OF ALL RESPONSES TO PHQ9 QUESTIONS 1 & 2: 0
SUM OF ALL RESPONSES TO PHQ QUESTIONS 1-9: 0
2. FEELING DOWN, DEPRESSED OR HOPELESS: 0
1. LITTLE INTEREST OR PLEASURE IN DOING THINGS: 0
SUM OF ALL RESPONSES TO PHQ QUESTIONS 1-9: 0

## 2018-10-10 DIAGNOSIS — R53.83 FATIGUE, UNSPECIFIED TYPE: ICD-10-CM

## 2018-10-10 LAB
BASOPHILS ABSOLUTE: 0 K/UL (ref 0–0.2)
BASOPHILS RELATIVE PERCENT: 0.5 %
EOSINOPHILS ABSOLUTE: 0.2 K/UL (ref 0–0.7)
EOSINOPHILS RELATIVE PERCENT: 2.9 %
HCT VFR BLD CALC: 45.1 % (ref 42–52)
HEMOGLOBIN: 15.6 G/DL (ref 14–18)
LYMPHOCYTES ABSOLUTE: 2.9 K/UL (ref 1–4.8)
LYMPHOCYTES RELATIVE PERCENT: 34 %
MCH RBC QN AUTO: 29.5 PG (ref 27–31.3)
MCHC RBC AUTO-ENTMCNC: 34.5 % (ref 33–37)
MCV RBC AUTO: 85.6 FL (ref 80–100)
MONOCYTES ABSOLUTE: 0.8 K/UL (ref 0.2–0.8)
MONOCYTES RELATIVE PERCENT: 9.1 %
NEUTROPHILS ABSOLUTE: 4.6 K/UL (ref 1.4–6.5)
NEUTROPHILS RELATIVE PERCENT: 53.5 %
PDW BLD-RTO: 13.9 % (ref 11.5–14.5)
PLATELET # BLD: 223 K/UL (ref 130–400)
RBC # BLD: 5.27 M/UL (ref 4.7–6.1)
WBC # BLD: 8.5 K/UL (ref 4.8–10.8)

## 2018-10-12 ENCOUNTER — TELEPHONE (OUTPATIENT)
Dept: FAMILY MEDICINE CLINIC | Age: 43
End: 2018-10-12

## 2018-10-31 ENCOUNTER — NURSE ONLY (OUTPATIENT)
Dept: FAMILY MEDICINE CLINIC | Age: 43
End: 2018-10-31
Payer: COMMERCIAL

## 2018-10-31 DIAGNOSIS — R79.89 LOW TESTOSTERONE IN MALE: Primary | ICD-10-CM

## 2018-10-31 PROCEDURE — 96372 THER/PROPH/DIAG INJ SC/IM: CPT | Performed by: FAMILY MEDICINE

## 2018-10-31 RX ORDER — TESTOSTERONE CYPIONATE 200 MG/ML
400 INJECTION INTRAMUSCULAR ONCE
Status: COMPLETED | OUTPATIENT
Start: 2018-10-31 | End: 2018-10-31

## 2018-10-31 RX ADMIN — TESTOSTERONE CYPIONATE 400 MG: 200 INJECTION INTRAMUSCULAR at 08:31

## 2018-11-28 ENCOUNTER — NURSE ONLY (OUTPATIENT)
Dept: FAMILY MEDICINE CLINIC | Age: 43
End: 2018-11-28
Payer: COMMERCIAL

## 2018-11-28 DIAGNOSIS — R79.89 LOW TESTOSTERONE IN MALE: Primary | ICD-10-CM

## 2018-11-28 PROCEDURE — 96372 THER/PROPH/DIAG INJ SC/IM: CPT | Performed by: FAMILY MEDICINE

## 2018-11-28 RX ORDER — TESTOSTERONE CYPIONATE 200 MG/ML
400 INJECTION INTRAMUSCULAR ONCE
Status: COMPLETED | OUTPATIENT
Start: 2018-11-28 | End: 2018-11-28

## 2018-11-28 RX ADMIN — TESTOSTERONE CYPIONATE 400 MG: 200 INJECTION INTRAMUSCULAR at 08:43

## 2018-11-28 NOTE — PROGRESS NOTES
Pt here for Testosterone Injection   Doing fine   Testosterone Cypionate 200mg/ml   Lot# [id number] Exp 4/20  2cc IM - Left Gluteal

## 2018-12-06 DIAGNOSIS — E29.1 HYPOGONADISM MALE: Primary | ICD-10-CM

## 2018-12-06 DIAGNOSIS — N40.0 BENIGN PROSTATIC HYPERPLASIA WITHOUT LOWER URINARY TRACT SYMPTOMS: ICD-10-CM

## 2018-12-06 RX ORDER — TAMSULOSIN HYDROCHLORIDE 0.4 MG/1
CAPSULE ORAL
Qty: 30 CAPSULE | Refills: 0 | Status: SHIPPED | OUTPATIENT
Start: 2018-12-06

## 2019-01-02 ENCOUNTER — NURSE ONLY (OUTPATIENT)
Dept: FAMILY MEDICINE CLINIC | Age: 44
End: 2019-01-02
Payer: COMMERCIAL

## 2019-01-02 DIAGNOSIS — R79.89 LOW TESTOSTERONE IN MALE: Primary | ICD-10-CM

## 2019-01-02 PROCEDURE — 96372 THER/PROPH/DIAG INJ SC/IM: CPT | Performed by: FAMILY MEDICINE

## 2019-01-02 RX ORDER — TESTOSTERONE CYPIONATE 200 MG/ML
400 INJECTION INTRAMUSCULAR ONCE
Status: COMPLETED | OUTPATIENT
Start: 2019-01-02 | End: 2019-01-02

## 2019-01-02 RX ADMIN — TESTOSTERONE CYPIONATE 400 MG: 200 INJECTION INTRAMUSCULAR at 09:10

## 2019-01-30 ENCOUNTER — NURSE ONLY (OUTPATIENT)
Dept: FAMILY MEDICINE CLINIC | Age: 44
End: 2019-01-30
Payer: COMMERCIAL

## 2019-01-30 DIAGNOSIS — R79.89 LOW TESTOSTERONE IN MALE: Primary | ICD-10-CM

## 2019-01-30 PROCEDURE — 96372 THER/PROPH/DIAG INJ SC/IM: CPT | Performed by: FAMILY MEDICINE

## 2019-01-30 RX ORDER — TESTOSTERONE CYPIONATE 200 MG/ML
400 INJECTION INTRAMUSCULAR ONCE
Status: COMPLETED | OUTPATIENT
Start: 2019-01-30 | End: 2019-01-30

## 2019-01-30 RX ADMIN — TESTOSTERONE CYPIONATE 400 MG: 200 INJECTION INTRAMUSCULAR at 08:51

## 2019-03-14 ENCOUNTER — NURSE ONLY (OUTPATIENT)
Dept: FAMILY MEDICINE CLINIC | Age: 44
End: 2019-03-14
Payer: COMMERCIAL

## 2019-03-14 DIAGNOSIS — E29.1 HYPOGONADISM MALE: Primary | ICD-10-CM

## 2019-03-14 PROCEDURE — 96372 THER/PROPH/DIAG INJ SC/IM: CPT | Performed by: FAMILY MEDICINE

## 2019-03-14 RX ORDER — TESTOSTERONE CYPIONATE 200 MG/ML
200 INJECTION INTRAMUSCULAR ONCE
Status: COMPLETED | OUTPATIENT
Start: 2019-03-14 | End: 2019-03-14

## 2019-03-14 RX ADMIN — TESTOSTERONE CYPIONATE 200 MG: 200 INJECTION INTRAMUSCULAR at 17:05

## 2023-03-23 ENCOUNTER — OFFICE VISIT (OUTPATIENT)
Dept: PRIMARY CARE | Facility: CLINIC | Age: 48
End: 2023-03-23
Payer: COMMERCIAL

## 2023-03-23 VITALS
SYSTOLIC BLOOD PRESSURE: 104 MMHG | BODY MASS INDEX: 32.66 KG/M2 | OXYGEN SATURATION: 97 % | RESPIRATION RATE: 18 BRPM | TEMPERATURE: 96.7 F | HEART RATE: 71 BPM | WEIGHT: 228.13 LBS | DIASTOLIC BLOOD PRESSURE: 74 MMHG | HEIGHT: 70 IN

## 2023-03-23 DIAGNOSIS — N40.0 BENIGN PROSTATIC HYPERPLASIA, UNSPECIFIED WHETHER LOWER URINARY TRACT SYMPTOMS PRESENT: ICD-10-CM

## 2023-03-23 DIAGNOSIS — R10.9 FLANK PAIN: ICD-10-CM

## 2023-03-23 DIAGNOSIS — J01.00 ACUTE NON-RECURRENT MAXILLARY SINUSITIS: Primary | ICD-10-CM

## 2023-03-23 PROBLEM — I10 HYPERTENSION: Status: ACTIVE | Noted: 2023-03-23

## 2023-03-23 PROBLEM — J34.89 SINUS PRESSURE: Status: ACTIVE | Noted: 2023-03-23

## 2023-03-23 PROBLEM — R10.32 COLICKY LLQ ABDOMINAL PAIN: Status: ACTIVE | Noted: 2023-03-23

## 2023-03-23 PROBLEM — G56.03 BILATERAL CARPAL TUNNEL SYNDROME: Status: ACTIVE | Noted: 2023-03-23

## 2023-03-23 PROBLEM — R79.89 LOW TESTOSTERONE IN MALE: Status: ACTIVE | Noted: 2023-03-23

## 2023-03-23 PROBLEM — R20.0 BILATERAL HAND NUMBNESS: Status: ACTIVE | Noted: 2023-03-23

## 2023-03-23 PROBLEM — E66.811 CLASS 1 OBESITY DUE TO EXCESS CALORIES WITH SERIOUS COMORBIDITY AND BODY MASS INDEX (BMI) OF 32.0 TO 32.9 IN ADULT: Status: ACTIVE | Noted: 2023-03-23

## 2023-03-23 PROBLEM — H53.2 DOUBLE VISION WITH BOTH EYES OPEN: Status: ACTIVE | Noted: 2023-03-23

## 2023-03-23 PROBLEM — R07.9 CHEST PAIN: Status: ACTIVE | Noted: 2023-03-23

## 2023-03-23 PROBLEM — K59.09 CHRONIC CONSTIPATION: Status: ACTIVE | Noted: 2023-03-23

## 2023-03-23 PROBLEM — M54.2 NECK PAIN, CHRONIC: Status: ACTIVE | Noted: 2023-03-23

## 2023-03-23 PROBLEM — F51.01 PRIMARY INSOMNIA: Status: ACTIVE | Noted: 2023-03-23

## 2023-03-23 PROBLEM — R42 VERTIGO: Status: ACTIVE | Noted: 2023-03-23

## 2023-03-23 PROBLEM — N20.1 URETERAL CALCULUS: Status: ACTIVE | Noted: 2023-03-23

## 2023-03-23 PROBLEM — N20.0 NEPHROLITHIASIS: Status: ACTIVE | Noted: 2023-03-23

## 2023-03-23 PROBLEM — K58.0 IRRITABLE BOWEL SYNDROME WITH DIARRHEA: Status: ACTIVE | Noted: 2023-03-23

## 2023-03-23 PROBLEM — G89.29 NECK PAIN, CHRONIC: Status: ACTIVE | Noted: 2023-03-23

## 2023-03-23 PROBLEM — E66.09 CLASS 1 OBESITY DUE TO EXCESS CALORIES WITH SERIOUS COMORBIDITY AND BODY MASS INDEX (BMI) OF 32.0 TO 32.9 IN ADULT: Status: ACTIVE | Noted: 2023-03-23

## 2023-03-23 PROBLEM — E29.1 HYPOGONADISM MALE: Status: ACTIVE | Noted: 2023-03-23

## 2023-03-23 LAB
POC APPEARANCE, URINE: CLEAR
POC BILIRUBIN, URINE: NEGATIVE
POC BLOOD, URINE: NEGATIVE
POC COLOR, URINE: YELLOW
POC GLUCOSE, URINE: NEGATIVE MG/DL
POC KETONES, URINE: NEGATIVE MG/DL
POC LEUKOCYTES, URINE: NEGATIVE
POC NITRITE,URINE: NEGATIVE
POC PH, URINE: 7 PH
POC PROTEIN, URINE: NEGATIVE MG/DL
POC SPECIFIC GRAVITY, URINE: 1.02
POC UROBILINOGEN, URINE: 0.2 EU/DL

## 2023-03-23 PROCEDURE — 3074F SYST BP LT 130 MM HG: CPT | Performed by: FAMILY MEDICINE

## 2023-03-23 PROCEDURE — 99213 OFFICE O/P EST LOW 20 MIN: CPT | Performed by: FAMILY MEDICINE

## 2023-03-23 PROCEDURE — 3078F DIAST BP <80 MM HG: CPT | Performed by: FAMILY MEDICINE

## 2023-03-23 PROCEDURE — 81003 URINALYSIS AUTO W/O SCOPE: CPT | Performed by: FAMILY MEDICINE

## 2023-03-23 PROCEDURE — 3008F BODY MASS INDEX DOCD: CPT | Performed by: FAMILY MEDICINE

## 2023-03-23 RX ORDER — TAMSULOSIN HYDROCHLORIDE 0.4 MG/1
1 CAPSULE ORAL DAILY
COMMUNITY
Start: 2019-12-06 | End: 2023-03-23 | Stop reason: SDUPTHER

## 2023-03-23 RX ORDER — TAMSULOSIN HYDROCHLORIDE 0.4 MG/1
0.4 CAPSULE ORAL DAILY
Qty: 90 CAPSULE | Refills: 1 | Status: SHIPPED | OUTPATIENT
Start: 2023-03-23 | End: 2023-12-06

## 2023-03-23 RX ORDER — VALSARTAN 80 MG/1
1 TABLET ORAL DAILY
COMMUNITY
Start: 2021-10-05 | End: 2023-06-07

## 2023-03-23 RX ORDER — MECLIZINE HYDROCHLORIDE 25 MG/1
25 TABLET ORAL EVERY 8 HOURS
COMMUNITY
Start: 2020-11-30

## 2023-03-23 RX ORDER — SULFAMETHOXAZOLE AND TRIMETHOPRIM 800; 160 MG/1; MG/1
1 TABLET ORAL 2 TIMES DAILY
Qty: 20 TABLET | Refills: 1 | Status: SHIPPED | OUTPATIENT
Start: 2023-03-23 | End: 2023-12-08

## 2023-03-23 RX ORDER — ZOLPIDEM TARTRATE 10 MG/1
10 TABLET ORAL NIGHTLY PRN
COMMUNITY
Start: 2019-12-06

## 2023-03-23 NOTE — PROGRESS NOTES
Subjective   Patient ID: Porfirio Santana is a 48 y.o. male who presents for Sinusitis and Nephrolithiasis.    HPI  Pt has been having LEFT sided pain ongoing x1 week.  Pain comes and goes.  Pt has kidney stones in past.  Pt used to see Dr. Zimmerman and Dr. Casanova in past.    Pt also believes he has sinus infection  Ongoing x 1 week  Was using OTC sinus medication w/o effect  Nasal congestion, ear pressure, headaches and chills and dry cough    Review of Systems  Constitutional:  no chills, no fever and no night sweats.  Eyes: no blurred vision and no eyesight problems.  ENT: no hearing loss, no nasal congestion, no hoarseness and no sore throat.  Neck: no mass (es) and no swelling.  Cardiovascular: no chest pain, no intermittent leg claudication, no lower extremity edema, no palpitation and no syncope.  Respiratory: no cough, no shortness of breath during exertion, no shortness of breath at rest and no wheezing.  Gastrointestinal: no abdominal pain, no blood in stools, no constipation, no diarrhea, no melena, no nausea, no rectal pain and no vomiting.  Genitourinary: no dysuria, no change in urinary frequency, no urinary hesitancy and no feelings of urinary urgency.  Musculoskeletal: no arthralgias, no back pain and no myalgias.  Integumentary: no new skin lesions and no rashes.  Neurological: no difficulty walking, no headache, no limb weakness, no numbness and no tingling.  Psychiatric/Behavioral: no anxiety, no depression, no anhedonia and no substance use disorders.  Endocrine: no recent weight gain and no recent weight loss.  Hematologic/Lymphatic: no tendency for easy bruising and no swollen glands    Objective   Physical Exam  Patient with complaints of another sinus infection also left flank pain feels like he is got a kidney stone that is moving he has had them before this has been going on for about a week.  He has frontal and maxillary sinus discomfort mostly on the right side TMs clear pharynx is  "slightly inflamed no drainage currently occasional cough mostly dry occasionally productive lungs are clear cardiac and abdominal exams are benign.  He has left flank pain he has no rebound or guarding normal bowel sounds.  Urinalysis does not show blood currently.  No evidence of UTI.  /74   Pulse 71   Temp 35.9 °C (96.7 °F)   Resp 18   Ht 1.778 m (5' 10\")   Wt 103 kg (228 lb 2 oz)   SpO2 97%   BMI 32.73 kg/m²     Lab Results   Component Value Date    WBC 9.9 12/20/2022    HGB 15.0 12/20/2022    HCT 44.8 12/20/2022    MCV 84 12/20/2022     12/20/2022       Assessment/Plan assessment sinusitis flank pain possible kidney stone may be stuck.  Plan is to refill his Flomax and increase fluid if pain persist through the weekend and into next week we will need to get a CAT scan to assure he does not have a renal stone that is obstructing the ureter.  He will let us know if the sinus infection does not resolve.  Problem List Items Addressed This Visit          Genitourinary    BPH (benign prostatic hyperplasia)     "

## 2023-03-27 RX ORDER — TESTOSTERONE CYPIONATE 200 MG/ML
400 INJECTION, SOLUTION INTRAMUSCULAR
Status: COMPLETED | OUTPATIENT
Start: 2023-03-29 | End: 2023-08-16

## 2023-03-29 ENCOUNTER — CLINICAL SUPPORT (OUTPATIENT)
Dept: PRIMARY CARE | Facility: CLINIC | Age: 48
End: 2023-03-29
Payer: COMMERCIAL

## 2023-03-29 DIAGNOSIS — E29.1 HYPOGONADISM MALE: Primary | ICD-10-CM

## 2023-03-29 PROCEDURE — 96372 THER/PROPH/DIAG INJ SC/IM: CPT | Performed by: FAMILY MEDICINE

## 2023-03-29 RX ADMIN — TESTOSTERONE CYPIONATE 400 MG: 200 INJECTION, SOLUTION INTRAMUSCULAR at 08:52

## 2023-03-29 NOTE — PROGRESS NOTES
Patient is here for testosterone injection.  Doing well. No complaints.  400 mg injection in Right Gluteal   Patient tolerated injection well.  Return as instructed in 28 days.    Labs: 12/20/23  CSA: 2/1/23

## 2023-04-03 ENCOUNTER — TELEPHONE (OUTPATIENT)
Dept: PRIMARY CARE | Facility: CLINIC | Age: 48
End: 2023-04-03
Payer: COMMERCIAL

## 2023-04-03 DIAGNOSIS — N20.0 KIDNEY STONE: ICD-10-CM

## 2023-04-03 DIAGNOSIS — R10.9 LEFT FLANK PAIN: Primary | ICD-10-CM

## 2023-04-03 NOTE — TELEPHONE ENCOUNTER
CT SCAN ORDERED    SPOKE WITH PATIENT  - HE WAS SCHEDULED AT THE [facility] LOCATION [address] [place] [address]. [facility] ON 4/17/23 @ 8:20 AM. - NO PREP

## 2023-04-03 NOTE — TELEPHONE ENCOUNTER
Pt is calling because he saw Dr Minaya on 3/23/23 for kidney pain and was given an antibiotic. He states he was supposed to call back if not better and he could get a CT scan. He has finished the antibiotic and he's still having some discomfort. Wanted to know if you could order a CT scan since Dr Minaya is out of the office  Please advise  Thanks    Pt's # [phone] can leave message

## 2023-04-13 ENCOUNTER — TELEPHONE (OUTPATIENT)
Dept: PRIMARY CARE | Facility: CLINIC | Age: 48
End: 2023-04-13
Payer: COMMERCIAL

## 2023-04-13 NOTE — TELEPHONE ENCOUNTER
----- Message from Aldo Minaya MD sent at 4/13/2023  7:04 AM EDT -----  CAT scan shows kidney stones and gallstones none of which are obstructing.  Should not be causing any abdominal pain.  If he still having discomfort needs to follow-up with GI.

## 2023-04-26 ENCOUNTER — CLINICAL SUPPORT (OUTPATIENT)
Dept: PRIMARY CARE | Facility: CLINIC | Age: 48
End: 2023-04-26
Payer: COMMERCIAL

## 2023-04-26 DIAGNOSIS — E29.1 HYPOGONADISM MALE: ICD-10-CM

## 2023-04-26 PROCEDURE — 96372 THER/PROPH/DIAG INJ SC/IM: CPT | Performed by: FAMILY MEDICINE

## 2023-04-26 RX ADMIN — TESTOSTERONE CYPIONATE 400 MG: 200 INJECTION, SOLUTION INTRAMUSCULAR at 09:00

## 2023-04-26 NOTE — PROGRESS NOTES
Patient is here for testosterone injection.  Doing well. No complaints.  400 mg injection in Left Gluteal.  Patient tolerated injection well.  Return as instructed in a month.    Labs: 12/20/2022  CSA: 02/01/2023

## 2023-05-17 RX ORDER — TESTOSTERONE ENANTHATE 200 MG/ML
400 VIAL (ML) INTRAMUSCULAR
Status: COMPLETED | OUTPATIENT
Start: 2023-05-18 | End: 2023-06-21

## 2023-05-24 ENCOUNTER — CLINICAL SUPPORT (OUTPATIENT)
Dept: PRIMARY CARE | Facility: CLINIC | Age: 48
End: 2023-05-24
Payer: COMMERCIAL

## 2023-05-24 DIAGNOSIS — E29.1 HYPOGONADISM MALE: ICD-10-CM

## 2023-05-24 PROCEDURE — 96372 THER/PROPH/DIAG INJ SC/IM: CPT | Performed by: FAMILY MEDICINE

## 2023-05-24 RX ADMIN — Medication 400 MG: at 08:15

## 2023-05-24 NOTE — PROGRESS NOTES
Patient is here for testosterone injection.  Doing well. No complaints.  400 mg injection in Right Gluteal   Patient tolerated injection well.  Return as instructed in a month.    Labs: 12/20/2022 - due for labs after next injection  CSA: 02/01/2023

## 2023-06-06 DIAGNOSIS — I10 HYPERTENSION, UNSPECIFIED TYPE: ICD-10-CM

## 2023-06-07 RX ORDER — VALSARTAN 80 MG/1
TABLET ORAL
Qty: 90 TABLET | Refills: 1 | Status: SHIPPED | OUTPATIENT
Start: 2023-06-07 | End: 2023-12-06

## 2023-06-14 ENCOUNTER — APPOINTMENT (OUTPATIENT)
Dept: PRIMARY CARE | Facility: CLINIC | Age: 48
End: 2023-06-14
Payer: COMMERCIAL

## 2023-06-21 ENCOUNTER — CLINICAL SUPPORT (OUTPATIENT)
Dept: PRIMARY CARE | Facility: CLINIC | Age: 48
End: 2023-06-21
Payer: COMMERCIAL

## 2023-06-21 DIAGNOSIS — R10.9 LEFT FLANK PAIN: ICD-10-CM

## 2023-06-21 DIAGNOSIS — E29.1 HYPOGONADISM MALE: ICD-10-CM

## 2023-06-21 DIAGNOSIS — Z12.5 SCREENING FOR PROSTATE CANCER: ICD-10-CM

## 2023-06-21 PROCEDURE — 96372 THER/PROPH/DIAG INJ SC/IM: CPT | Performed by: FAMILY MEDICINE

## 2023-06-21 RX ADMIN — Medication 400 MG: at 08:18

## 2023-06-21 NOTE — PROGRESS NOTES
Patient is here for testosterone injection.  Doing well. No complaints.  400 mg injection in Left Gluteal   Patient tolerated injection well.  Return as instructed in 28 days.    Labs: 12/20/2022 - ordered  CSA: 02/01/2023

## 2023-07-19 ENCOUNTER — CLINICAL SUPPORT (OUTPATIENT)
Dept: PRIMARY CARE | Facility: CLINIC | Age: 48
End: 2023-07-19
Payer: COMMERCIAL

## 2023-07-19 DIAGNOSIS — E29.1 HYPOGONADISM MALE: ICD-10-CM

## 2023-07-19 DIAGNOSIS — Z12.5 SCREENING FOR PROSTATE CANCER: ICD-10-CM

## 2023-07-19 PROCEDURE — 96372 THER/PROPH/DIAG INJ SC/IM: CPT | Performed by: FAMILY MEDICINE

## 2023-07-19 RX ADMIN — TESTOSTERONE CYPIONATE 400 MG: 200 INJECTION, SOLUTION INTRAMUSCULAR at 07:54

## 2023-07-19 NOTE — PROGRESS NOTES
Patient is here for testosterone injection.  Doing well. No complaints.  400 mg injection in Right Gluteal   Patient tolerated injection well.  Return as instructed in 28 days.    Labs: 12/20/2022 - ordered and patient aware  CSA: 02/01/2023

## 2023-08-02 ENCOUNTER — LAB (OUTPATIENT)
Dept: LAB | Facility: LAB | Age: 48
End: 2023-08-02
Payer: COMMERCIAL

## 2023-08-02 DIAGNOSIS — Z12.5 SCREENING FOR PROSTATE CANCER: ICD-10-CM

## 2023-08-02 DIAGNOSIS — E29.1 HYPOGONADISM MALE: ICD-10-CM

## 2023-08-02 LAB
BASOPHILS (10*3/UL) IN BLOOD BY AUTOMATED COUNT: 0.05 X10E9/L (ref 0–0.1)
BASOPHILS/100 LEUKOCYTES IN BLOOD BY AUTOMATED COUNT: 0.6 % (ref 0–2)
EOSINOPHILS (10*3/UL) IN BLOOD BY AUTOMATED COUNT: 0.32 X10E9/L (ref 0–0.7)
EOSINOPHILS/100 LEUKOCYTES IN BLOOD BY AUTOMATED COUNT: 3.9 % (ref 0–6)
ERYTHROCYTE DISTRIBUTION WIDTH (RATIO) BY AUTOMATED COUNT: 13 % (ref 11.5–14.5)
ERYTHROCYTE MEAN CORPUSCULAR HEMOGLOBIN CONCENTRATION (G/DL) BY AUTOMATED: 33.7 G/DL (ref 32–36)
ERYTHROCYTE MEAN CORPUSCULAR VOLUME (FL) BY AUTOMATED COUNT: 85 FL (ref 80–100)
ERYTHROCYTES (10*6/UL) IN BLOOD BY AUTOMATED COUNT: 5.3 X10E12/L (ref 4.5–5.9)
HEMATOCRIT (%) IN BLOOD BY AUTOMATED COUNT: 44.8 % (ref 41–52)
HEMOGLOBIN (G/DL) IN BLOOD: 15.1 G/DL (ref 13.5–17.5)
IMMATURE GRANULOCYTES/100 LEUKOCYTES IN BLOOD BY AUTOMATED COUNT: 0.2 % (ref 0–0.9)
LEUKOCYTES (10*3/UL) IN BLOOD BY AUTOMATED COUNT: 8.2 X10E9/L (ref 4.4–11.3)
LYMPHOCYTES (10*3/UL) IN BLOOD BY AUTOMATED COUNT: 2.94 X10E9/L (ref 1.2–4.8)
LYMPHOCYTES/100 LEUKOCYTES IN BLOOD BY AUTOMATED COUNT: 35.9 % (ref 13–44)
MONOCYTES (10*3/UL) IN BLOOD BY AUTOMATED COUNT: 0.7 X10E9/L (ref 0.1–1)
MONOCYTES/100 LEUKOCYTES IN BLOOD BY AUTOMATED COUNT: 8.6 % (ref 2–10)
NEUTROPHILS (10*3/UL) IN BLOOD BY AUTOMATED COUNT: 4.15 X10E9/L (ref 1.2–7.7)
NEUTROPHILS/100 LEUKOCYTES IN BLOOD BY AUTOMATED COUNT: 50.8 % (ref 40–80)
PLATELETS (10*3/UL) IN BLOOD AUTOMATED COUNT: 251 X10E9/L (ref 150–450)
PROSTATE SPECIFIC ANTIGEN,SCREEN: 1.36 NG/ML (ref 0–4)

## 2023-08-02 PROCEDURE — 84153 ASSAY OF PSA TOTAL: CPT

## 2023-08-02 PROCEDURE — 36415 COLL VENOUS BLD VENIPUNCTURE: CPT

## 2023-08-02 PROCEDURE — 84403 ASSAY OF TOTAL TESTOSTERONE: CPT

## 2023-08-02 PROCEDURE — 84402 ASSAY OF FREE TESTOSTERONE: CPT

## 2023-08-02 PROCEDURE — 85025 COMPLETE CBC W/AUTO DIFF WBC: CPT

## 2023-08-07 LAB
TESTOSTERONE FREE (CHAN): 201.7 PG/ML (ref 35–155)
TESTOSTERONE,TOTAL,LC-MS/MS: 917 NG/DL (ref 250–1100)

## 2023-08-14 RX ORDER — TESTOSTERONE CYPIONATE 200 MG/ML
400 INJECTION, SOLUTION INTRAMUSCULAR
Status: CANCELLED | OUTPATIENT
Start: 2023-08-16 | End: 2023-12-06

## 2023-08-16 ENCOUNTER — CLINICAL SUPPORT (OUTPATIENT)
Dept: PRIMARY CARE | Facility: CLINIC | Age: 48
End: 2023-08-16
Payer: COMMERCIAL

## 2023-08-16 DIAGNOSIS — E29.1 HYPOGONADISM MALE: ICD-10-CM

## 2023-08-16 PROCEDURE — 96372 THER/PROPH/DIAG INJ SC/IM: CPT | Performed by: FAMILY MEDICINE

## 2023-08-16 RX ADMIN — TESTOSTERONE CYPIONATE 400 MG: 200 INJECTION, SOLUTION INTRAMUSCULAR at 07:40

## 2023-08-16 NOTE — PROGRESS NOTES
Patient is here for testosterone injection.  Doing well. No complaints.  400 mg injection in Left Gluteal   Patient tolerated injection well.  Return as instructed in a month.    Labs: 08/02/2023 - recheck in December per Dr. Minaya  CSA: 02/01/2023

## 2023-09-06 RX ORDER — TESTOSTERONE CYPIONATE 200 MG/ML
400 INJECTION, SOLUTION INTRAMUSCULAR
Status: COMPLETED | OUTPATIENT
Start: 2023-09-13 | End: 2023-12-13

## 2023-09-13 ENCOUNTER — APPOINTMENT (OUTPATIENT)
Dept: PRIMARY CARE | Facility: CLINIC | Age: 48
End: 2023-09-13
Payer: COMMERCIAL

## 2023-09-20 ENCOUNTER — CLINICAL SUPPORT (OUTPATIENT)
Dept: PRIMARY CARE | Facility: CLINIC | Age: 48
End: 2023-09-20
Payer: COMMERCIAL

## 2023-09-20 DIAGNOSIS — E29.1 HYPOGONADISM MALE: ICD-10-CM

## 2023-09-20 PROCEDURE — 96372 THER/PROPH/DIAG INJ SC/IM: CPT | Performed by: FAMILY MEDICINE

## 2023-09-20 RX ADMIN — TESTOSTERONE CYPIONATE 400 MG: 200 INJECTION, SOLUTION INTRAMUSCULAR at 09:29

## 2023-09-20 NOTE — PROGRESS NOTES
Patient is here for testosterone injection.  Doing well. No complaints.  400 mg injection in Right Gluteal   Patient tolerated injection well.  Return as instructed in a month.    Labs: 08/02/2023 - recheck in December per Dr. Minaya  CSA: 02/01/2023

## 2023-10-18 ENCOUNTER — CLINICAL SUPPORT (OUTPATIENT)
Dept: PRIMARY CARE | Facility: CLINIC | Age: 48
End: 2023-10-18
Payer: COMMERCIAL

## 2023-10-18 DIAGNOSIS — E29.1 HYPOGONADISM MALE: ICD-10-CM

## 2023-10-18 PROCEDURE — 96372 THER/PROPH/DIAG INJ SC/IM: CPT | Performed by: FAMILY MEDICINE

## 2023-10-18 RX ADMIN — TESTOSTERONE CYPIONATE 400 MG: 200 INJECTION, SOLUTION INTRAMUSCULAR at 08:20

## 2023-10-18 NOTE — PROGRESS NOTES
Patient is here for testosterone injection.  Doing well. No complaints.  400 mg injection in Left Gluteal   Patient tolerated injection well.  Return as instructed in a month.    Labs: 08/02/2023 - repeat in December per Dr. Minaya  CSA: 02/01/2023

## 2023-11-15 ENCOUNTER — CLINICAL SUPPORT (OUTPATIENT)
Dept: PRIMARY CARE | Facility: CLINIC | Age: 48
End: 2023-11-15
Payer: COMMERCIAL

## 2023-11-15 DIAGNOSIS — E29.1 HYPOGONADISM MALE: ICD-10-CM

## 2023-11-15 PROCEDURE — 96372 THER/PROPH/DIAG INJ SC/IM: CPT | Performed by: FAMILY MEDICINE

## 2023-11-15 RX ADMIN — TESTOSTERONE CYPIONATE 400 MG: 200 INJECTION, SOLUTION INTRAMUSCULAR at 07:43

## 2023-11-15 NOTE — PROGRESS NOTES
Patient is here for testosterone injection.  Doing well. No complaints.  400 mg injection in Right Gluteal   Patient tolerated injection well.  Return as instructed in a month.    Labs: 08/02/2023 - repeat in December per Dr. Minaya  CSA: 02/01/2023

## 2023-12-05 DIAGNOSIS — N40.0 BENIGN PROSTATIC HYPERPLASIA, UNSPECIFIED WHETHER LOWER URINARY TRACT SYMPTOMS PRESENT: ICD-10-CM

## 2023-12-05 DIAGNOSIS — I10 HYPERTENSION, UNSPECIFIED TYPE: ICD-10-CM

## 2023-12-06 RX ORDER — VALSARTAN 80 MG/1
TABLET ORAL
Qty: 90 TABLET | Refills: 0 | Status: SHIPPED | OUTPATIENT
Start: 2023-12-06 | End: 2024-05-28

## 2023-12-06 RX ORDER — TAMSULOSIN HYDROCHLORIDE 0.4 MG/1
0.4 CAPSULE ORAL DAILY
Qty: 90 CAPSULE | Refills: 0 | Status: SHIPPED | OUTPATIENT
Start: 2023-12-06 | End: 2024-05-28

## 2023-12-06 NOTE — TELEPHONE ENCOUNTER
Last seen in March, please call and schedule appointment.  Can send in short supply if needed, just let us know.  Thanks ladies!

## 2023-12-08 DIAGNOSIS — J01.00 ACUTE NON-RECURRENT MAXILLARY SINUSITIS: ICD-10-CM

## 2023-12-08 DIAGNOSIS — R10.9 FLANK PAIN: ICD-10-CM

## 2023-12-08 RX ORDER — SULFAMETHOXAZOLE AND TRIMETHOPRIM 800; 160 MG/1; MG/1
TABLET ORAL
Qty: 20 TABLET | Refills: 0 | Status: SHIPPED | OUTPATIENT
Start: 2023-12-08

## 2023-12-13 ENCOUNTER — CLINICAL SUPPORT (OUTPATIENT)
Dept: PRIMARY CARE | Facility: CLINIC | Age: 48
End: 2023-12-13
Payer: COMMERCIAL

## 2023-12-13 DIAGNOSIS — E29.1 HYPOGONADISM MALE: ICD-10-CM

## 2023-12-13 PROCEDURE — 96372 THER/PROPH/DIAG INJ SC/IM: CPT | Performed by: FAMILY MEDICINE

## 2023-12-13 RX ADMIN — TESTOSTERONE CYPIONATE 400 MG: 200 INJECTION, SOLUTION INTRAMUSCULAR at 07:42

## 2023-12-13 NOTE — PROGRESS NOTES
Patient is here for testosterone injection.  Doing well. No complaints.  400 mg injection in Left Gluteal   Patient tolerated injection well.  Return as instructed in a month.    Labs: 08/02/2023 - ordered to have done in December per Dr. Minaya  CSA: 02/01/2023

## 2023-12-22 RX ORDER — SULFAMETHOXAZOLE AND TRIMETHOPRIM 800; 160 MG/1; MG/1
TABLET ORAL
Qty: 20 TABLET | Refills: 0 | OUTPATIENT
Start: 2023-12-22

## 2023-12-27 ENCOUNTER — LAB (OUTPATIENT)
Dept: LAB | Facility: LAB | Age: 48
End: 2023-12-27
Payer: COMMERCIAL

## 2023-12-27 DIAGNOSIS — E29.1 HYPOGONADISM MALE: ICD-10-CM

## 2023-12-27 PROCEDURE — 36415 COLL VENOUS BLD VENIPUNCTURE: CPT

## 2023-12-27 PROCEDURE — 84402 ASSAY OF FREE TESTOSTERONE: CPT

## 2023-12-31 LAB
TESTOSTERONE FREE (CHAN): 153.9 PG/ML (ref 35–155)
TESTOSTERONE,TOTAL,LC-MS/MS: 762 NG/DL (ref 250–1100)

## 2024-01-03 ENCOUNTER — APPOINTMENT (OUTPATIENT)
Dept: PRIMARY CARE | Facility: CLINIC | Age: 49
End: 2024-01-03
Payer: COMMERCIAL

## 2024-01-05 RX ORDER — TESTOSTERONE CYPIONATE 200 MG/ML
400 INJECTION, SOLUTION INTRAMUSCULAR
Status: SHIPPED | OUTPATIENT
Start: 2024-01-10 | End: 2024-05-01

## 2024-01-10 ENCOUNTER — CLINICAL SUPPORT (OUTPATIENT)
Dept: PRIMARY CARE | Facility: CLINIC | Age: 49
End: 2024-01-10
Payer: COMMERCIAL

## 2024-01-10 DIAGNOSIS — E29.1 HYPOGONADISM MALE: ICD-10-CM

## 2024-01-10 PROCEDURE — 96372 THER/PROPH/DIAG INJ SC/IM: CPT | Performed by: FAMILY MEDICINE

## 2024-01-10 RX ADMIN — TESTOSTERONE CYPIONATE 400 MG: 200 INJECTION, SOLUTION INTRAMUSCULAR at 07:34

## 2024-01-10 NOTE — PROGRESS NOTES
Patient is here for testosterone injection.  Doing well. No complaints.  400 mg injection in Right Gluteal   Patient tolerated injection well.  Return as instructed in a month.    Labs: 12/27/2023  CSA: 02/01/2023 - due at next injection

## 2024-02-07 ENCOUNTER — CLINICAL SUPPORT (OUTPATIENT)
Dept: PRIMARY CARE | Facility: CLINIC | Age: 49
End: 2024-02-07
Payer: COMMERCIAL

## 2024-02-07 DIAGNOSIS — E29.1 HYPOGONADISM MALE: ICD-10-CM

## 2024-02-07 PROCEDURE — 96372 THER/PROPH/DIAG INJ SC/IM: CPT | Performed by: FAMILY MEDICINE

## 2024-02-07 RX ADMIN — TESTOSTERONE CYPIONATE 400 MG: 200 INJECTION, SOLUTION INTRAMUSCULAR at 07:56

## 2024-02-07 NOTE — PROGRESS NOTES
Patient is here for testosterone injection.  Doing well. No complaints.  400 mg injection in Left Gluteal   Patient tolerated injection well.  Return as instructed in a month.    Labs: 12/27/2023  CSA: 02/07/2024

## 2024-03-06 ENCOUNTER — CLINICAL SUPPORT (OUTPATIENT)
Dept: PRIMARY CARE | Facility: CLINIC | Age: 49
End: 2024-03-06
Payer: COMMERCIAL

## 2024-03-06 DIAGNOSIS — E29.1 HYPOGONADISM MALE: ICD-10-CM

## 2024-03-06 PROCEDURE — 96372 THER/PROPH/DIAG INJ SC/IM: CPT | Performed by: FAMILY MEDICINE

## 2024-03-06 RX ADMIN — TESTOSTERONE CYPIONATE 400 MG: 200 INJECTION, SOLUTION INTRAMUSCULAR at 07:32

## 2024-03-06 NOTE — PROGRESS NOTES
Patient is here for testosterone injection.  Doing well. No complaints.  400 mg injection in Right Gluteal   Patient tolerated injection well.  Return as instructed in a month.    Labs: 12/27/2023  CSA: 02/07/2024

## 2024-04-24 RX ORDER — TESTOSTERONE CYPIONATE 200 MG/ML
400 INJECTION, SOLUTION INTRAMUSCULAR
Status: SHIPPED | OUTPATIENT
Start: 2024-04-25 | End: 2024-08-15

## 2024-05-01 ENCOUNTER — CLINICAL SUPPORT (OUTPATIENT)
Dept: PRIMARY CARE | Facility: CLINIC | Age: 49
End: 2024-05-01
Payer: COMMERCIAL

## 2024-05-01 DIAGNOSIS — E29.1 HYPOGONADISM MALE: ICD-10-CM

## 2024-05-01 PROCEDURE — 96372 THER/PROPH/DIAG INJ SC/IM: CPT | Performed by: FAMILY MEDICINE

## 2024-05-01 RX ADMIN — TESTOSTERONE CYPIONATE 400 MG: 200 INJECTION, SOLUTION INTRAMUSCULAR at 07:28

## 2024-05-25 DIAGNOSIS — N40.0 BENIGN PROSTATIC HYPERPLASIA, UNSPECIFIED WHETHER LOWER URINARY TRACT SYMPTOMS PRESENT: ICD-10-CM

## 2024-05-25 DIAGNOSIS — I10 HYPERTENSION, UNSPECIFIED TYPE: ICD-10-CM

## 2024-05-28 RX ORDER — TAMSULOSIN HYDROCHLORIDE 0.4 MG/1
0.4 CAPSULE ORAL DAILY
Qty: 90 CAPSULE | Refills: 0 | Status: SHIPPED | OUTPATIENT
Start: 2024-05-28

## 2024-05-28 RX ORDER — VALSARTAN 80 MG/1
TABLET ORAL
Qty: 90 TABLET | Refills: 0 | Status: SHIPPED | OUTPATIENT
Start: 2024-05-28

## 2024-05-29 ENCOUNTER — CLINICAL SUPPORT (OUTPATIENT)
Dept: PRIMARY CARE | Facility: CLINIC | Age: 49
End: 2024-05-29
Payer: COMMERCIAL

## 2024-05-29 DIAGNOSIS — E29.1 HYPOGONADISM MALE: ICD-10-CM

## 2024-05-29 PROCEDURE — 96372 THER/PROPH/DIAG INJ SC/IM: CPT | Performed by: FAMILY MEDICINE

## 2024-05-29 RX ADMIN — TESTOSTERONE CYPIONATE 400 MG: 200 INJECTION, SOLUTION INTRAMUSCULAR at 07:51

## 2024-05-29 NOTE — PROGRESS NOTES
Patient is here for testosterone injection.  Doing well. No complaints.  400 mg injection in Right Gluteal   Patient tolerated injection well.  Return as instructed in a month.    Labs: 12/27/2023 - due after next injection  CSA: 02/07/2024

## 2024-06-12 NOTE — PROGRESS NOTES
Subjective   Patient ID: Porfirio Santana is a 49 y.o. male who presents for Annual Exam, Insomnia, Low Testosterone, Chest Pain, and Dizziness.  HPI  Patient presents today for a physical. Does not need form filled out. Is not fasting for blood work. Tries to eat a generally healthy diet. Exercises.    Also presents today for a follow-up on Insomnia. Is taking Ambien 10 MG. No issues with the medication. Reports that the medication gives 80% insomnia control/relief. [place]AR[place] reviewed today. Controlled substance contract signed today. Tries not to take them often. Last took it 1 and a half months ago.    Wants to discuss doing testosterone injections at home. 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 Has been getting his injection through [facility].    Complaining of chest pain x 2 months. It's waxing, and waning. States it lasts 15-90 minutes. He has not gone to the hospital for it. Sometimes gets dizzy when standing up after bending over.        Has no other new problem /question.      Review of Systems  Constitutional:  no chills, no fever and no night sweats.  Eyes: no blurred vision and no eyesight problems.  ENT: no hearing loss, no nasal congestion, no hoarseness and no sore throat.  Neck: no mass (es) and no swelling.  Cardiovascular: no chest pain, no intermittent leg claudication, no lower extremity edema, no palpitation and no syncope.  Respiratory: no cough, no shortness of breath during exertion, no shortness of breath at rest and no wheezing.  Gastrointestinal: no abdominal pain, no blood in stools, no constipation, no diarrhea, no melena, no nausea, no rectal pain and no vomiting.  Genitourinary: no dysuria, no change in urinary frequency, no urinary hesitancy and no feelings of urinary urgency.  Musculoskeletal: no arthralgias, no back pain and no myalgias.  Integumentary: no new skin lesions and no rashes.  Neurological: no difficulty walking, no headache, no limb  "weakness, no numbness and no tingling.  Psychiatric/Behavioral: no anxiety, no depression, no anhedonia and no substance use disorders.  Endocrine: no recent weight gain and no recent weight loss.  Hematologic/Lymphatic: no tendency for easy bruising and no swollen glands    Objective   Physical Exam  Patient complaints of chest pain and shortness of breath with exertion.  Strong family history of coronary artery disease.  Also here for annual exam needs blood work and medication refills.  Well-developed well-nourished male in no acute distress physical exam today's office visit constitutional alert and oriented x3.    Head is atraumatic HEENT is within normal limits.    Neck supple no masses full range of motion.    Thyroid is normal in size no thyromegaly there is no carotid bruits.    Pulmonary exam shows clear to auscultation no respiratory distress.    Cardiovascular shows no murmur rub or gallop.  Regular rate and rhythm.    Abdominal exam soft nontender no hepatosplenomegaly or masses normal bowel sounds no rebound no guarding.    Musculoskeletal exam no joint pain no muscle pain full range of motion.    Psych exam normal mood and affect.    Dermatologic exam no skin lesions no rash no blemishes.    Neuro exam is no focal deficits.  Normal exam.    Extremities no edema normal pulses normal capillary refill.  Mental status normal mood and affect.  Patient needs to start giving him his own testosterone shots or his wife can do warm due to changes in shifts at work.  /80   Pulse 78   Temp 37.2 °C (98.9 °F)   Ht 1.778 m (5' 10\")   Wt 108 kg (238 lb 6.4 oz)   SpO2 98%   BMI 34.21 kg/m²     Lab Results   Component Value Date    WBC 8.2 08/02/2023    HGB 15.1 08/02/2023    HCT 44.8 08/02/2023    MCV 85 08/02/2023     08/02/2023       Assessment/Plan EKG normal sinus having chest pain with exertion.  And shortness of breath will need to get a stress test done due to strong family history of early " cardiac disease in men in the family.  Get blood drawn refill medication follow-up when we have the test results may need cardiology referral  Problem List Items Addressed This Visit       BPH (benign prostatic hyperplasia)    Chest pain    Relevant Orders    ECG 12 lead (Clinic Performed)    Class 1 obesity due to excess calories with serious comorbidity and body mass index (BMI) of 34.0 to 34.9 in adult    Hypertension    Hypogonadism male    BMI 34.0-34.9,adult     Other Visit Diagnoses       Routine general medical examination at a health care facility    -  Primary    Dizziness        Relevant Orders    ECG 12 lead (Clinic Performed)

## 2024-06-13 ENCOUNTER — OFFICE VISIT (OUTPATIENT)
Dept: PRIMARY CARE | Facility: CLINIC | Age: 49
End: 2024-06-13
Payer: COMMERCIAL

## 2024-06-13 VITALS
TEMPERATURE: 98.9 F | OXYGEN SATURATION: 98 % | HEIGHT: 70 IN | WEIGHT: 238.4 LBS | HEART RATE: 78 BPM | SYSTOLIC BLOOD PRESSURE: 130 MMHG | DIASTOLIC BLOOD PRESSURE: 80 MMHG | BODY MASS INDEX: 34.13 KG/M2

## 2024-06-13 DIAGNOSIS — E66.09 CLASS 1 OBESITY DUE TO EXCESS CALORIES WITH SERIOUS COMORBIDITY AND BODY MASS INDEX (BMI) OF 34.0 TO 34.9 IN ADULT: ICD-10-CM

## 2024-06-13 DIAGNOSIS — R42 DIZZINESS: ICD-10-CM

## 2024-06-13 DIAGNOSIS — I10 HYPERTENSION, UNSPECIFIED TYPE: ICD-10-CM

## 2024-06-13 DIAGNOSIS — Z00.00 ROUTINE GENERAL MEDICAL EXAMINATION AT A HEALTH CARE FACILITY: Primary | ICD-10-CM

## 2024-06-13 DIAGNOSIS — E29.1 HYPOGONADISM MALE: ICD-10-CM

## 2024-06-13 DIAGNOSIS — R07.9 CHEST PAIN, UNSPECIFIED TYPE: ICD-10-CM

## 2024-06-13 DIAGNOSIS — N40.0 BENIGN PROSTATIC HYPERPLASIA, UNSPECIFIED WHETHER LOWER URINARY TRACT SYMPTOMS PRESENT: ICD-10-CM

## 2024-06-13 PROCEDURE — 99396 PREV VISIT EST AGE 40-64: CPT | Performed by: FAMILY MEDICINE

## 2024-06-13 PROCEDURE — 3079F DIAST BP 80-89 MM HG: CPT | Performed by: FAMILY MEDICINE

## 2024-06-13 PROCEDURE — 3008F BODY MASS INDEX DOCD: CPT | Performed by: FAMILY MEDICINE

## 2024-06-13 PROCEDURE — 3075F SYST BP GE 130 - 139MM HG: CPT | Performed by: FAMILY MEDICINE

## 2024-06-13 PROCEDURE — 1036F TOBACCO NON-USER: CPT | Performed by: FAMILY MEDICINE

## 2024-06-13 PROCEDURE — 93000 ELECTROCARDIOGRAM COMPLETE: CPT | Performed by: FAMILY MEDICINE

## 2024-06-13 RX ORDER — TESTOSTERONE CYPIONATE 200 MG/ML
400 INJECTION, SOLUTION INTRAMUSCULAR
Qty: 2 ML | Refills: 3 | Status: SHIPPED | OUTPATIENT
Start: 2024-06-13

## 2024-06-13 RX ORDER — NEEDLES, DISPOSABLE 27GX1/2"
NEEDLE, DISPOSABLE MISCELLANEOUS
Qty: 100 EACH | Refills: 3 | Status: SHIPPED | OUTPATIENT
Start: 2024-06-13

## 2024-06-13 ASSESSMENT — PATIENT HEALTH QUESTIONNAIRE - PHQ9
2. FEELING DOWN, DEPRESSED OR HOPELESS: NOT AT ALL
1. LITTLE INTEREST OR PLEASURE IN DOING THINGS: NOT AT ALL
SUM OF ALL RESPONSES TO PHQ9 QUESTIONS 1 AND 2: 0

## 2024-06-26 ENCOUNTER — APPOINTMENT (OUTPATIENT)
Dept: PRIMARY CARE | Facility: CLINIC | Age: 49
End: 2024-06-26
Payer: COMMERCIAL

## 2024-06-26 DIAGNOSIS — E29.1 HYPOGONADISM MALE: ICD-10-CM

## 2024-06-26 PROCEDURE — 96372 THER/PROPH/DIAG INJ SC/IM: CPT | Performed by: FAMILY MEDICINE

## 2024-06-26 NOTE — PROGRESS NOTES
Patient is here for testosterone injection.  Doing well. No complaints.  400 mg injection in Left Gluteal   Patient tolerated injection well.  Return as instructed in a month.    Labs: 12/27/2023 - ordered  CSA: 02/07/2024

## 2024-06-28 ENCOUNTER — CLINICAL SUPPORT (OUTPATIENT)
Dept: CARDIOLOGY | Facility: HOSPITAL | Age: 49
End: 2024-06-28
Payer: COMMERCIAL

## 2024-06-28 DIAGNOSIS — R06.02 SHORTNESS OF BREATH: ICD-10-CM

## 2024-06-28 DIAGNOSIS — R07.9 CHEST PAIN: ICD-10-CM

## 2024-06-28 PROCEDURE — 78452 HT MUSCLE IMAGE SPECT MULT: CPT

## 2024-06-28 PROCEDURE — 93018 CV STRESS TEST I&R ONLY: CPT | Performed by: INTERNAL MEDICINE

## 2024-06-28 PROCEDURE — 78452 HT MUSCLE IMAGE SPECT MULT: CPT | Performed by: INTERNAL MEDICINE

## 2024-06-28 PROCEDURE — 93016 CV STRESS TEST SUPVJ ONLY: CPT | Performed by: INTERNAL MEDICINE

## 2024-06-28 RX ORDER — REGADENOSON 0.08 MG/ML
0.4 INJECTION, SOLUTION INTRAVENOUS ONCE
Status: CANCELLED | OUTPATIENT
Start: 2024-06-28 | End: 2024-06-28

## 2024-06-28 RX ORDER — REGADENOSON 0.08 MG/ML
0.4 INJECTION, SOLUTION INTRAVENOUS ONCE
Status: COMPLETED | OUTPATIENT
Start: 2024-06-28 | End: 2024-06-28

## 2024-07-01 ENCOUNTER — TELEPHONE (OUTPATIENT)
Dept: PRIMARY CARE | Facility: CLINIC | Age: 49
End: 2024-07-01
Payer: COMMERCIAL

## 2024-07-01 NOTE — TELEPHONE ENCOUNTER
----- Message from Aldo Minaya MD sent at 7/1/2024  8:16 AM EDT -----  Stress test normal.  No obvious evidence of cardiovascular disease.

## 2024-07-09 ENCOUNTER — APPOINTMENT (OUTPATIENT)
Dept: RADIOLOGY | Facility: HOSPITAL | Age: 49
End: 2024-07-09
Payer: COMMERCIAL

## 2024-07-09 ENCOUNTER — APPOINTMENT (OUTPATIENT)
Dept: CARDIOLOGY | Facility: HOSPITAL | Age: 49
End: 2024-07-09
Payer: COMMERCIAL

## 2024-08-07 ENCOUNTER — LAB (OUTPATIENT)
Dept: LAB | Facility: LAB | Age: 49
End: 2024-08-07
Payer: COMMERCIAL

## 2024-08-07 DIAGNOSIS — I10 HYPERTENSION, UNSPECIFIED TYPE: ICD-10-CM

## 2024-08-07 DIAGNOSIS — E29.1 HYPOGONADISM MALE: ICD-10-CM

## 2024-08-07 DIAGNOSIS — N40.0 BENIGN PROSTATIC HYPERPLASIA, UNSPECIFIED WHETHER LOWER URINARY TRACT SYMPTOMS PRESENT: ICD-10-CM

## 2024-08-07 DIAGNOSIS — Z00.00 ROUTINE GENERAL MEDICAL EXAMINATION AT A HEALTH CARE FACILITY: ICD-10-CM

## 2024-08-07 LAB
ALBUMIN SERPL BCP-MCNC: 3.9 G/DL (ref 3.4–5)
ALP SERPL-CCNC: 59 U/L (ref 33–120)
ALT SERPL W P-5'-P-CCNC: 20 U/L (ref 10–52)
ANION GAP SERPL CALC-SCNC: 10 MMOL/L (ref 10–20)
AST SERPL W P-5'-P-CCNC: 19 U/L (ref 9–39)
BASOPHILS # BLD AUTO: 0.07 X10*3/UL (ref 0–0.1)
BASOPHILS NFR BLD AUTO: 1 %
BILIRUB SERPL-MCNC: 0.6 MG/DL (ref 0–1.2)
BUN SERPL-MCNC: 11 MG/DL (ref 6–23)
CALCIUM SERPL-MCNC: 8.7 MG/DL (ref 8.6–10.3)
CHLORIDE SERPL-SCNC: 107 MMOL/L (ref 98–107)
CHOLEST SERPL-MCNC: 178 MG/DL (ref 0–199)
CHOLESTEROL/HDL RATIO: 3.6
CO2 SERPL-SCNC: 25 MMOL/L (ref 21–32)
CREAT SERPL-MCNC: 1.03 MG/DL (ref 0.5–1.3)
EGFRCR SERPLBLD CKD-EPI 2021: 88 ML/MIN/1.73M*2
EOSINOPHIL # BLD AUTO: 0.4 X10*3/UL (ref 0–0.7)
EOSINOPHIL NFR BLD AUTO: 5.8 %
ERYTHROCYTE [DISTWIDTH] IN BLOOD BY AUTOMATED COUNT: 13 % (ref 11.5–14.5)
GLUCOSE SERPL-MCNC: 87 MG/DL (ref 74–99)
HCT VFR BLD AUTO: 45.4 % (ref 41–52)
HDLC SERPL-MCNC: 49 MG/DL
HGB BLD-MCNC: 15.4 G/DL (ref 13.5–17.5)
IMM GRANULOCYTES # BLD AUTO: 0.02 X10*3/UL (ref 0–0.7)
IMM GRANULOCYTES NFR BLD AUTO: 0.3 % (ref 0–0.9)
LDLC SERPL CALC-MCNC: 109 MG/DL
LYMPHOCYTES # BLD AUTO: 2.51 X10*3/UL (ref 1.2–4.8)
LYMPHOCYTES NFR BLD AUTO: 36.2 %
MCH RBC QN AUTO: 28.8 PG (ref 26–34)
MCHC RBC AUTO-ENTMCNC: 33.9 G/DL (ref 32–36)
MCV RBC AUTO: 85 FL (ref 80–100)
MONOCYTES # BLD AUTO: 0.62 X10*3/UL (ref 0.1–1)
MONOCYTES NFR BLD AUTO: 8.9 %
NEUTROPHILS # BLD AUTO: 3.32 X10*3/UL (ref 1.2–7.7)
NEUTROPHILS NFR BLD AUTO: 47.8 %
NON HDL CHOLESTEROL: 129 MG/DL (ref 0–149)
NRBC BLD-RTO: 0 /100 WBCS (ref 0–0)
PLATELET # BLD AUTO: 223 X10*3/UL (ref 150–450)
POTASSIUM SERPL-SCNC: 4.3 MMOL/L (ref 3.5–5.3)
PROT SERPL-MCNC: 6.4 G/DL (ref 6.4–8.2)
PSA SERPL-MCNC: 0.97 NG/ML
RBC # BLD AUTO: 5.34 X10*6/UL (ref 4.5–5.9)
SODIUM SERPL-SCNC: 138 MMOL/L (ref 136–145)
TESTOST SERPL-MCNC: 963 NG/DL (ref 240–1000)
TRIGL SERPL-MCNC: 101 MG/DL (ref 0–149)
VLDL: 20 MG/DL (ref 0–40)
WBC # BLD AUTO: 6.9 X10*3/UL (ref 4.4–11.3)

## 2024-08-07 PROCEDURE — 84153 ASSAY OF PSA TOTAL: CPT

## 2024-08-07 PROCEDURE — 80053 COMPREHEN METABOLIC PANEL: CPT

## 2024-08-07 PROCEDURE — 85025 COMPLETE CBC W/AUTO DIFF WBC: CPT

## 2024-08-07 PROCEDURE — 80061 LIPID PANEL: CPT

## 2024-08-07 PROCEDURE — 84403 ASSAY OF TOTAL TESTOSTERONE: CPT

## 2024-08-07 PROCEDURE — 36415 COLL VENOUS BLD VENIPUNCTURE: CPT

## 2024-08-08 ENCOUNTER — TELEPHONE (OUTPATIENT)
Dept: PRIMARY CARE | Facility: CLINIC | Age: 49
End: 2024-08-08
Payer: COMMERCIAL

## 2024-08-08 NOTE — TELEPHONE ENCOUNTER
----- Message from Aldo Minaya sent at 8/8/2024  9:32 AM EDT -----  Testosterone level normal no change in dose

## 2024-08-31 DIAGNOSIS — N40.0 BENIGN PROSTATIC HYPERPLASIA, UNSPECIFIED WHETHER LOWER URINARY TRACT SYMPTOMS PRESENT: ICD-10-CM

## 2024-08-31 DIAGNOSIS — I10 HYPERTENSION, UNSPECIFIED TYPE: ICD-10-CM

## 2024-09-04 RX ORDER — VALSARTAN 80 MG/1
TABLET ORAL
Qty: 90 TABLET | Refills: 0 | Status: SHIPPED | OUTPATIENT
Start: 2024-09-04

## 2024-09-04 RX ORDER — TAMSULOSIN HYDROCHLORIDE 0.4 MG/1
0.4 CAPSULE ORAL DAILY
Qty: 90 CAPSULE | Refills: 0 | Status: SHIPPED | OUTPATIENT
Start: 2024-09-04

## 2024-10-22 ENCOUNTER — TELEPHONE (OUTPATIENT)
Dept: PRIMARY CARE | Facility: CLINIC | Age: 49
End: 2024-10-22
Payer: COMMERCIAL

## 2024-10-22 DIAGNOSIS — R10.9 FLANK PAIN: ICD-10-CM

## 2024-10-22 DIAGNOSIS — J01.00 ACUTE NON-RECURRENT MAXILLARY SINUSITIS: ICD-10-CM

## 2024-10-22 DIAGNOSIS — J01.00 ACUTE MAXILLARY SINUSITIS, RECURRENCE NOT SPECIFIED: Primary | ICD-10-CM

## 2024-10-22 RX ORDER — AZITHROMYCIN 250 MG/1
TABLET, FILM COATED ORAL
Qty: 6 TABLET | Refills: 0 | Status: SHIPPED | OUTPATIENT
Start: 2024-10-22 | End: 2024-10-26

## 2024-10-22 RX ORDER — SULFAMETHOXAZOLE AND TRIMETHOPRIM 800; 160 MG/1; MG/1
TABLET ORAL
Qty: 20 TABLET | Refills: 0 | OUTPATIENT
Start: 2024-10-22

## 2024-10-22 NOTE — TELEPHONE ENCOUNTER
Patient  calling  has sinus infection, Ears itching, headache, sinus pressure, stuffed up, cough, is negative for covid. Can you send something to [facility] in [place],

## 2024-10-23 NOTE — TELEPHONE ENCOUNTER
Not a maintenance med. If patient is having symptoms possibly warranting antimicrobial therapy, please schedule appt.

## 2024-10-23 NOTE — TELEPHONE ENCOUNTER
Aldo Minaya MD  [facility] [id number] [id number] Clinical Support Staff15 hours ago (5:25 PM)       Prescription was sent in

## 2024-10-23 NOTE — TELEPHONE ENCOUNTER
Patient called back and aware of RX request from pharmacy for Bactrim. He states he didn't need this RX. Please disregard  Thanks

## 2024-11-13 DIAGNOSIS — E29.1 HYPOGONADISM MALE: ICD-10-CM

## 2024-11-14 RX ORDER — TESTOSTERONE CYPIONATE 200 MG/ML
INJECTION, SOLUTION INTRAMUSCULAR
Qty: 2 ML | Refills: 0 | Status: SHIPPED | OUTPATIENT
Start: 2024-11-14

## 2024-11-28 DIAGNOSIS — I10 HYPERTENSION, UNSPECIFIED TYPE: ICD-10-CM

## 2024-11-28 DIAGNOSIS — N40.0 BENIGN PROSTATIC HYPERPLASIA, UNSPECIFIED WHETHER LOWER URINARY TRACT SYMPTOMS PRESENT: ICD-10-CM

## 2024-11-29 ENCOUNTER — TELEPHONE (OUTPATIENT)
Dept: PRIMARY CARE | Facility: CLINIC | Age: 49
End: 2024-11-29
Payer: COMMERCIAL

## 2024-11-29 DIAGNOSIS — N40.0 BENIGN PROSTATIC HYPERPLASIA, UNSPECIFIED WHETHER LOWER URINARY TRACT SYMPTOMS PRESENT: ICD-10-CM

## 2024-11-29 DIAGNOSIS — I10 HYPERTENSION, UNSPECIFIED TYPE: ICD-10-CM

## 2024-11-29 RX ORDER — TAMSULOSIN HYDROCHLORIDE 0.4 MG/1
0.4 CAPSULE ORAL DAILY
Qty: 14 CAPSULE | Refills: 0 | Status: SHIPPED | OUTPATIENT
Start: 2024-11-29

## 2024-11-29 RX ORDER — VALSARTAN 80 MG/1
TABLET ORAL
Qty: 14 TABLET | Refills: 0 | Status: SHIPPED | OUTPATIENT
Start: 2024-11-29

## 2024-11-29 NOTE — TELEPHONE ENCOUNTER
Rx Refill Request Telephone Encounter    Name:  Porfirio Santana  : 1975     Medication Name:    valsartan (Diovan) 80 mg tablet [[id number]]    Order Details    Dose, Route, Frequency: As Directed   Dispense Quantity: 90 tablet Refills: 0          Sig: TAKE 1 TABLET BY MOUTH EVERY DAY           tamsulosin (Flomax) 0.4 mg 24 hr capsule [[id number]]    Order Details    Dose: 0.4 mg Route: oral Frequency: Daily   Dispense Quantity: 90 capsule Refills: 0          Sig: TAKE 1 CAPSULE BY MOUTH EVERY DAY             ALLERGIES:   NKA    Specific Pharmacy location:  [address]     Date of last appointment: 24    Best number to reach patient:  [phone]

## 2024-12-13 ENCOUNTER — APPOINTMENT (OUTPATIENT)
Dept: PRIMARY CARE | Facility: CLINIC | Age: 49
End: 2024-12-13
Payer: COMMERCIAL

## 2024-12-13 DIAGNOSIS — E29.1 HYPOGONADISM MALE: ICD-10-CM

## 2024-12-16 RX ORDER — TESTOSTERONE CYPIONATE 200 MG/ML
INJECTION, SOLUTION INTRAMUSCULAR
Qty: 2 ML | Refills: 0 | Status: SHIPPED | OUTPATIENT
Start: 2024-12-16 | End: 2024-12-19

## 2024-12-19 ENCOUNTER — APPOINTMENT (OUTPATIENT)
Dept: PRIMARY CARE | Facility: CLINIC | Age: 49
End: 2024-12-19
Payer: COMMERCIAL

## 2024-12-19 DIAGNOSIS — E29.1 HYPOGONADISM MALE: ICD-10-CM

## 2024-12-19 RX ORDER — TESTOSTERONE CYPIONATE 200 MG/ML
INJECTION, SOLUTION INTRAMUSCULAR
Qty: 2 ML | Refills: 0 | Status: SHIPPED | OUTPATIENT
Start: 2024-12-19

## 2024-12-23 ENCOUNTER — APPOINTMENT (OUTPATIENT)
Dept: PRIMARY CARE | Facility: CLINIC | Age: 49
End: 2024-12-23
Payer: COMMERCIAL

## 2024-12-23 VITALS
WEIGHT: 233 LBS | HEIGHT: 70 IN | DIASTOLIC BLOOD PRESSURE: 70 MMHG | OXYGEN SATURATION: 97 % | TEMPERATURE: 97.2 F | SYSTOLIC BLOOD PRESSURE: 116 MMHG | BODY MASS INDEX: 33.36 KG/M2 | HEART RATE: 73 BPM

## 2024-12-23 DIAGNOSIS — E29.1 HYPOGONADISM MALE: ICD-10-CM

## 2024-12-23 DIAGNOSIS — N40.0 BENIGN PROSTATIC HYPERPLASIA, UNSPECIFIED WHETHER LOWER URINARY TRACT SYMPTOMS PRESENT: ICD-10-CM

## 2024-12-23 DIAGNOSIS — I10 HYPERTENSION, UNSPECIFIED TYPE: ICD-10-CM

## 2024-12-23 PROCEDURE — 1036F TOBACCO NON-USER: CPT | Performed by: FAMILY MEDICINE

## 2024-12-23 PROCEDURE — 99213 OFFICE O/P EST LOW 20 MIN: CPT | Performed by: FAMILY MEDICINE

## 2024-12-23 PROCEDURE — 3008F BODY MASS INDEX DOCD: CPT | Performed by: FAMILY MEDICINE

## 2024-12-23 PROCEDURE — 3078F DIAST BP <80 MM HG: CPT | Performed by: FAMILY MEDICINE

## 2024-12-23 PROCEDURE — 3074F SYST BP LT 130 MM HG: CPT | Performed by: FAMILY MEDICINE

## 2024-12-23 RX ORDER — TAMSULOSIN HYDROCHLORIDE 0.4 MG/1
0.4 CAPSULE ORAL DAILY
Qty: 90 CAPSULE | Refills: 1 | Status: SHIPPED | OUTPATIENT
Start: 2024-12-23 | End: 2025-03-23

## 2024-12-23 RX ORDER — VALSARTAN 80 MG/1
80 TABLET ORAL DAILY
Qty: 90 TABLET | Refills: 1 | Status: SHIPPED | OUTPATIENT
Start: 2024-12-23 | End: 2025-03-23

## 2024-12-23 ASSESSMENT — PATIENT HEALTH QUESTIONNAIRE - PHQ9
SUM OF ALL RESPONSES TO PHQ9 QUESTIONS 1 AND 2: 0
2. FEELING DOWN, DEPRESSED OR HOPELESS: NOT AT ALL
1. LITTLE INTEREST OR PLEASURE IN DOING THINGS: NOT AT ALL

## 2024-12-23 NOTE — PROGRESS NOTES
Subjective   Patient ID: Porfirio Santana is a 49 y.o. male who presents for No chief complaint on file..  HPI  HTN follow up  Denies chest pain,SOB, swelling, headaches, lightheadedness or dizziness.   Eats a generally healthy diet, Exercises.   Does check BP at home.   Medication working well.     Taking current medications which were reviewed.  Problem list discussed.    Overall doing well.  Eating okay.  Staying active.    Has no other new problem /question.    Review of Systems  Constitutional:  no chills, no fever and no night sweats.  Eyes: no blurred vision and no eyesight problems.  ENT: no hearing loss, no nasal congestion, no hoarseness and no sore throat.  Neck: no mass (es) and no swelling.  Cardiovascular: no chest pain, no intermittent leg claudication, no lower extremity edema, no palpitation and no syncope.  Respiratory: no cough, no shortness of breath during exertion, no shortness of breath at rest and no wheezing.  Gastrointestinal: no abdominal pain, no blood in stools, no constipation, no diarrhea, no melena, no nausea, no rectal pain and no vomiting.  Genitourinary: no dysuria, no change in urinary frequency, no urinary hesitancy and no feelings of urinary urgency.  Musculoskeletal: no arthralgias, no back pain and no myalgias.  Integumentary: no new skin lesions and no rashes.  Neurological: no difficulty walking, no headache, no limb weakness, no numbness and no tingling.  Psychiatric/Behavioral: no anxiety, no depression, no anhedonia and no substance use disorders.  Endocrine: no recent weight gain and no recent weight loss.  Hematologic/Lymphatic: no tendency for easy bruising and no swollen glands    Objective   Physical Exam  Patient here for medication refill overall doing well no new complaints sleeping well blood pressures under good control.  Well-developed well-nourished male in no acute distress physical exam today's office visit constitutional alert and oriented x3.    Head is  atraumatic HEENT is within normal limits.    Neck supple no masses full range of motion.    Thyroid is normal in size no thyromegaly there is no carotid bruits.    Pulmonary exam shows clear to auscultation no respiratory distress.    Cardiovascular shows no murmur rub or gallop.  Regular rate and rhythm.    Abdominal exam soft nontender no hepatosplenomegaly or masses normal bowel sounds no rebound no guarding.    Musculoskeletal exam no joint pain no muscle pain full range of motion.    Psych exam normal mood and affect.    Dermatologic exam no skin lesions no rash no blemishes.    Neuro exam is no focal deficits.  Normal exam.    Extremities no edema normal pulses normal capillary refill.    There were no vitals taken for this visit.    Lab Results   Component Value Date    WBC 6.9 08/07/2024    HGB 15.4 08/07/2024    HCT 45.4 08/07/2024    MCV 85 08/07/2024     08/07/2024       Assessment/Plan plan refills medication if doing well routine recheck 6 months  Problem List Items Addressed This Visit    None

## 2025-01-15 DIAGNOSIS — E29.1 HYPOGONADISM MALE: ICD-10-CM

## 2025-01-16 RX ORDER — TESTOSTERONE CYPIONATE 200 MG/ML
INJECTION, SOLUTION INTRAMUSCULAR
Qty: 2 ML | Refills: 2 | Status: SHIPPED | OUTPATIENT
Start: 2025-01-16

## 2025-04-17 DIAGNOSIS — E29.1 HYPOGONADISM MALE: ICD-10-CM

## 2025-04-17 RX ORDER — TESTOSTERONE CYPIONATE 200 MG/ML
200 INJECTION, SOLUTION INTRAMUSCULAR
Qty: 2 ML | Refills: 1 | Status: SHIPPED | OUTPATIENT
Start: 2025-04-17

## 2025-04-17 NOTE — TELEPHONE ENCOUNTER
MEDICATION PENDED  Received a fax from [facility] pharmacy in [place] on  for a RX refill request for    Rx Refill Request Telephone Encounter    Name:  Porfirio Santana  : 1975     Medication Name:  Testosterone cypionate intramuscular solution  Dose (Optional):    200 mg/ ml  Quantity (Optional):    2 ml  Directions (Optional):   Inject 2 ml into the muscle every thirty days    ALLERGIES:   nkda     Specific Pharmacy location:  [facility]  in [place]    Date of last appointment:  24  Date of next appointment:  none     Best number to reach patient:  [phone]

## 2025-06-16 NOTE — PROGRESS NOTES
"Subjective   Patient ID: Porfirio Santana is a 50 y.o. male who presents for Foot Injury and Arm Pain.  HPI    Patient presents in office today for \"Pain in the left heel and above the heel.  Pain in the right arm.\" Patient states he thinks it is a heel spur. Ongoing X 4 months. Patient has tried show inserts.    Review of Systems  Constitutional:  no chills, no fever and no night sweats.  Eyes: no blurred vision and no eyesight problems.  ENT: no hearing loss, no nasal congestion, no hoarseness and no sore throat.  Neck: no mass (es) and no swelling.  Cardiovascular: no chest pain, no intermittent leg claudication, no lower extremity edema, no palpitation and no syncope.  Respiratory: no cough, no shortness of breath during exertion, no shortness of breath at rest and no wheezing.  Gastrointestinal: no abdominal pain, no blood in stools, no constipation, no diarrhea, no melena, no nausea, no rectal pain and no vomiting.  Genitourinary: no dysuria, no change in urinary frequency, no urinary hesitancy and no feelings of urinary urgency.  Musculoskeletal: no arthralgias, no back pain and no myalgias.  Integumentary: no new skin lesions and no rashes.  Neurological: no difficulty walking, no headache, no limb weakness, no numbness and no tingling.  Psychiatric/Behavioral: no anxiety, no depression, no anhedonia and no substance use disorders.  Endocrine: no recent weight gain and no recent weight loss.  Hematologic/Lymphatic: no tendency for easy bruising and no swollen glands    Objective   Physical Exam  With complaints of 4 months of left heel pain also right forearm pain for the past few months no specific trauma for the heel he has tried stretching heat ice different orthotics that he bought over-the-counter nothing is helped not sure what is going on with there is forearm.  Exam well-developed well-nourished male in no acute distress she has pain and tenderness at the plantar insertion and at the insertion of the " "Achilles onto the calcaneus..  This is consistent with plantar fasciitis and Achilles tendinitis he also has pain and tenderness of the right brachial radialis muscle although no evidence of lateral epicondyle.  Pulse 75   Temp 36.4 °C (97.5 °F) (Temporal)   Ht 1.778 m (5' 10\")   Wt 108 kg (237 lb 3.2 oz)   SpO2 97%   BMI 34.03 kg/m²     Lab Results   Component Value Date    WBC 6.9 08/07/2024    HGB 15.4 08/07/2024    HCT 45.4 08/07/2024    MCV 85 08/07/2024     08/07/2024       Assessment/Plan plan burst and taper prednisone follow-up in 2 weeks may need physical therapy or referral to podiatry.  Problem List Items Addressed This Visit       Class 1 obesity due to excess calories with serious comorbidity and body mass index (BMI) of 34.0 to 34.9 in adult    BMI 34.0-34.9,adult     "

## 2025-06-17 ENCOUNTER — APPOINTMENT (OUTPATIENT)
Dept: PRIMARY CARE | Facility: CLINIC | Age: 50
End: 2025-06-17
Payer: COMMERCIAL

## 2025-06-17 VITALS
WEIGHT: 237.2 LBS | OXYGEN SATURATION: 97 % | DIASTOLIC BLOOD PRESSURE: 80 MMHG | HEART RATE: 75 BPM | SYSTOLIC BLOOD PRESSURE: 124 MMHG | TEMPERATURE: 97.5 F | HEIGHT: 70 IN | BODY MASS INDEX: 33.96 KG/M2

## 2025-06-17 DIAGNOSIS — E66.09 CLASS 1 OBESITY DUE TO EXCESS CALORIES WITH SERIOUS COMORBIDITY AND BODY MASS INDEX (BMI) OF 34.0 TO 34.9 IN ADULT: ICD-10-CM

## 2025-06-17 DIAGNOSIS — M72.2 PLANTAR FASCIITIS: Primary | ICD-10-CM

## 2025-06-17 DIAGNOSIS — E66.811 CLASS 1 OBESITY DUE TO EXCESS CALORIES WITH SERIOUS COMORBIDITY AND BODY MASS INDEX (BMI) OF 34.0 TO 34.9 IN ADULT: ICD-10-CM

## 2025-06-17 DIAGNOSIS — M25.531 FOREARM JOINT PAIN, RIGHT: ICD-10-CM

## 2025-06-17 PROCEDURE — 99213 OFFICE O/P EST LOW 20 MIN: CPT | Performed by: FAMILY MEDICINE

## 2025-06-17 PROCEDURE — 3074F SYST BP LT 130 MM HG: CPT | Performed by: FAMILY MEDICINE

## 2025-06-17 PROCEDURE — 3008F BODY MASS INDEX DOCD: CPT | Performed by: FAMILY MEDICINE

## 2025-06-17 PROCEDURE — 3079F DIAST BP 80-89 MM HG: CPT | Performed by: FAMILY MEDICINE

## 2025-06-17 PROCEDURE — 1036F TOBACCO NON-USER: CPT | Performed by: FAMILY MEDICINE

## 2025-06-17 RX ORDER — PREDNISONE 10 MG/1
TABLET ORAL
Qty: 51 TABLET | Refills: 0 | Status: SHIPPED | OUTPATIENT
Start: 2025-06-17

## 2025-06-17 RX ORDER — TAMSULOSIN HYDROCHLORIDE 0.4 MG/1
0.4 CAPSULE ORAL DAILY
COMMUNITY

## 2025-06-22 DIAGNOSIS — I10 HYPERTENSION, UNSPECIFIED TYPE: ICD-10-CM

## 2025-06-23 RX ORDER — VALSARTAN 80 MG/1
80 TABLET ORAL DAILY
Qty: 90 TABLET | Refills: 1 | Status: SHIPPED | OUTPATIENT
Start: 2025-06-23

## 2025-06-23 RX ORDER — TAMSULOSIN HYDROCHLORIDE 0.4 MG/1
0.4 CAPSULE ORAL DAILY
Qty: 90 CAPSULE | Refills: 1 | Status: SHIPPED | OUTPATIENT
Start: 2025-06-23

## 2025-06-27 ENCOUNTER — TELEPHONE (OUTPATIENT)
Dept: PRIMARY CARE | Facility: CLINIC | Age: 50
End: 2025-06-27
Payer: COMMERCIAL

## 2025-06-27 NOTE — TELEPHONE ENCOUNTER
Aldo Minaya MD to [id number] Clinical Support Staff (Selected Message)        6/27/25  4:53 PM  Calf muscle and Achilles stretches as we discussed.    Patient aware

## 2025-06-27 NOTE — TELEPHONE ENCOUNTER
Patient calling, he wanted to let you know that the steroid seems to be helping with the planter fasciitis. He is not sure if there is anything else that he needs to be doing but it seems to be working.    Please advise.

## 2025-08-07 DIAGNOSIS — E29.1 HYPOGONADISM MALE: ICD-10-CM

## 2025-08-07 RX ORDER — TESTOSTERONE CYPIONATE 200 MG/ML
INJECTION, SOLUTION INTRAMUSCULAR
Qty: 2 ML | Refills: 1 | Status: SHIPPED | OUTPATIENT
Start: 2025-08-07